# Patient Record
Sex: MALE | Race: WHITE | NOT HISPANIC OR LATINO | Employment: FULL TIME | URBAN - METROPOLITAN AREA
[De-identification: names, ages, dates, MRNs, and addresses within clinical notes are randomized per-mention and may not be internally consistent; named-entity substitution may affect disease eponyms.]

---

## 2017-01-10 ENCOUNTER — OFFICE VISIT (OUTPATIENT)
Dept: FAMILY MEDICINE | Facility: CLINIC | Age: 52
End: 2017-01-10
Payer: OTHER MISCELLANEOUS

## 2017-01-10 ENCOUNTER — ALLIED HEALTH/NURSE VISIT (OUTPATIENT)
Dept: FAMILY MEDICINE | Facility: CLINIC | Age: 52
End: 2017-01-10
Payer: OTHER MISCELLANEOUS

## 2017-01-10 ENCOUNTER — RADIANT APPOINTMENT (OUTPATIENT)
Dept: GENERAL RADIOLOGY | Facility: CLINIC | Age: 52
End: 2017-01-10
Attending: NURSE PRACTITIONER
Payer: OTHER MISCELLANEOUS

## 2017-01-10 VITALS
SYSTOLIC BLOOD PRESSURE: 138 MMHG | WEIGHT: 166 LBS | HEART RATE: 71 BPM | TEMPERATURE: 97.7 F | DIASTOLIC BLOOD PRESSURE: 80 MMHG | RESPIRATION RATE: 16 BRPM | HEIGHT: 70 IN | BODY MASS INDEX: 23.77 KG/M2

## 2017-01-10 DIAGNOSIS — M25.561 ACUTE PAIN OF RIGHT KNEE: ICD-10-CM

## 2017-01-10 DIAGNOSIS — M54.50 ACUTE RIGHT-SIDED LOW BACK PAIN WITHOUT SCIATICA: Primary | ICD-10-CM

## 2017-01-10 DIAGNOSIS — S39.92XA BACK INJURY, INITIAL ENCOUNTER: ICD-10-CM

## 2017-01-10 DIAGNOSIS — Z23 NEED FOR PROPHYLACTIC VACCINATION AND INOCULATION AGAINST INFLUENZA: Primary | ICD-10-CM

## 2017-01-10 DIAGNOSIS — R01.1 MURMUR, CARDIAC: ICD-10-CM

## 2017-01-10 PROCEDURE — 90471 IMMUNIZATION ADMIN: CPT

## 2017-01-10 PROCEDURE — 90686 IIV4 VACC NO PRSV 0.5 ML IM: CPT

## 2017-01-10 PROCEDURE — 99203 OFFICE O/P NEW LOW 30 MIN: CPT | Performed by: NURSE PRACTITIONER

## 2017-01-10 PROCEDURE — 71101 X-RAY EXAM UNILAT RIBS/CHEST: CPT | Mod: RT

## 2017-01-10 PROCEDURE — 99207 ZZC NO CHARGE NURSE ONLY: CPT

## 2017-01-10 ASSESSMENT — PAIN SCALES - GENERAL: PAINLEVEL: EXTREME PAIN (8)

## 2017-01-10 NOTE — MR AVS SNAPSHOT
After Visit Summary   1/10/2017    Joshua Braden    MRN: [id number]           Patient Information     Date Of Birth          1965        Visit Information        Provider Department      1/10/2017 8:40 AM Lisbeth Burns APRN [facility]        Today's Diagnoses     Acute right-sided low back pain without sciatica    -  1     Murmur, cardiac         Acute pain of right knee         Back injury, initial encounter           Care Instructions    Continue with rest and gentle stretching, ice or heat and ibuprofen as needed for pain.  Call to start physical therapy.  Will notify of pending x ray of ribs.  Follow up if symptoms persist or worsen and as needed.        Thank you for choosing [facility].  You may be receiving a survey in the mail from [employer] regarding your visit today.  Please take a few minutes to complete and return the survey to let us know how we are doing.      Our Clinic hours are:  Mondays    7:20 am - 7 pm  Tues -  Fri  7:20 am - 5 pm    Clinic Phone: [phone]    The clinic lab opens at 7:30 am Mon - Fri and appointments are required.    [facility]. [phone]  Monday-Thursday 8 am - 7pm  Tues/Wed/Fri 8 am - 5:30 pm               Follow-ups after your visit        Additional Services     PHYSICAL THERAPY REFERRAL       *This therapy referral will be filtered to a centralized scheduling office at [facility] and the patient will receive a call to schedule an appointment at a [facility] location most convenient for them. *     [facility] provides Physical Therapy evaluation and treatment and many specialty services across the [facility] system.  If requesting a specialty program, please choose from the list below.    If you have not heard from the scheduling office within 2 business days, please call [phone] for all locations, with the exception of [place], please call  [phone].  Treatment: Evaluation & Treatment  Special Instructions/Modalities:   Special Programs:     Please be aware that coverage of these services is subject to the terms and limitations of your health insurance plan.  Call member services at your health plan with any benefit or coverage questions.      **Note to Provider:  If you are referring outside of [place] for the therapy appointment, please list the name of the location in the  special instructions  above, print the referral and give to the patient to schedule the appointment.                  Your next 10 appointments already scheduled     Hilario 10, 2017  9:10 AM   XR RIBS & CHEST RIGHT G/E 3 VIEWS with CLXR1   [facility]    [address] [address] [phone]           Please bring a list of your current medicines to your exam. (Include vitamins, minerals and over-thecounter medicines.) Leave your valuables at home.  Tell your doctor if there is a chance you may be pregnant.  You do not need to do anything special for this exam.              Who to contact     If you have questions or need follow up information about today's clinic visit or your schedule please contact [facility]note 2] directly at [phone].  Normal or non-critical lab and imaging results will be communicated to you by MyChart, letter or phone within 4 business days after the clinic has received the results. If you do not hear from us within 7 days, please contact the clinic through [email or web address] or phone. If you have a critical or abnormal lab result, we will notify you by phone as soon as possible.  Submit refill requests through [employer] or call your pharmacy and they will forward the refill request to us. Please allow 3 business days for your refill to be completed.          Additional Information About Your Visit        [employer] Information     [employer] lets you send messages to your doctor,  "view your test results, renew your prescriptions, schedule appointments and more. To sign up, go to [email or web address] . Click on \"Log in\" on the left side of the screen, which will take you to the Welcome page. Then click on \"Sign up Now\" on the right side of the page.     You will be asked to enter the access code listed below, as well as some personal information. Please follow the directions to create your username and password.     Your access code is: [email or web address]  Expires: 4/10/2017  9:08 AM     Your access code will  in 90 days. If you need help or a new code, please call your [facility] or [phone].        Care EveryWhere ID     This is your Care EveryWhere ID. This could be used by other organizations to access your [place] medical records  [id number]        Your Vitals Were     Pulse Temperature Respirations Height BMI (Body Mass Index)       71 97.7  F (36.5  C) (Oral) 16 5' 9.5\" (1.765 m) 24.17 kg/m2        Blood Pressure from Last 3 Encounters:   01/10/17 138/80    Weight from Last 3 Encounters:   01/10/17 166 lb (75.297 kg)              We Performed the Following     PHYSICAL THERAPY REFERRAL        Primary Care Provider    None Specified       No primary provider on file.        Thank you!     Thank you for choosing [facility]  for your care. Our goal is always to provide you with excellent care. Hearing back from our patients is one way we can continue to improve our services. Please take a few minutes to complete the written survey that you may receive in the mail after your visit with us. Thank you!             Your Updated Medication List - Protect others around you: Learn how to safely use, store and throw away your medicines at www.disposemymeds.org.      Notice  As of 1/10/2017  9:08 AM    You have not been prescribed any medications.      "

## 2017-01-10 NOTE — PATIENT INSTRUCTIONS
Continue with rest and gentle stretching, ice or heat and ibuprofen as needed for pain.  Call to start physical therapy.  Will notify of pending x ray of ribs.  Follow up if symptoms persist or worsen and as needed.        Thank you for choosing [facility].  You may be receiving a survey in the mail from [employer] regarding your visit today.  Please take a few minutes to complete and return the survey to let us know how we are doing.      Our Clinic hours are:  Mondays    7:20 am - 7 pm  Tues -  Fri  7:20 am - 5 pm    Clinic Phone: [phone]    The clinic lab opens at 7:30 am Mon - Fri and appointments are required.    [facility]. [phone]  Monday-Thursday 8 am - 7pm  Tues/Wed/Fri 8 am - 5:30 pm

## 2017-01-10 NOTE — PROGRESS NOTES
SUBJECTIVE:                                                    Joshua Braden is a 51 year old male who presents to clinic today for the following health issues:      Pt is new here to us, he moved here this last summer. He is here for a work comp injury from 1/3/2017 where he slipped on the ice and injured his right low back and right knee.        Problem list and histories reviewed & adjusted, as indicated.  Additional history: new patient to this clinic, previous healthcare from [place] and he now lives in the area.  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 He was out at a job site one week ago when he slipped on ice while getting out of his truck. Initially he felt pain in left elbow, where he landed and right knee. His left elbow never bruised or swelled and is now fine. His right knee never bruised or swelled and is much better, occasionally he'll feel some discomfort if he moves his right knee in a particular manor and pain is located on outer knee. He has pain in his low back now, worse on right side without radiation into buttocks or legs. He states heat feels really good. He initially did try some ice. Ibuprofen helps as well.  He is able to do his usual and full work duties.   PMH was updated in EMR and is non contributory to today's workman's comp issues.  He will sign release of past medical records.      Patient Active Problem List   Diagnosis     Murmur, cardiac     Past Surgical History   Procedure Laterality Date     Hernia repair       bilateral inguinal 2010     Ent surgery       adenoidectomy and PE tubes as a child       Social History   Substance Use Topics     Smoking status: Never Smoker      Smokeless tobacco: Not on file     Alcohol Use: Yes     Family History   Problem Relation Age of Onset     Lung Cancer Father          No current outpatient prescriptions on file.     No Known Allergies    ROS:  C: NEGATIVE for fever, chills, change in weight or  "appetite  INTEGUMENTARY/SKIN: NEGATIVE for worrisome rashes, moles or lesions  E/M: NEGATIVE for eye, ear or throat problems  R: NEGATIVE for significant cough or SOB  CV: NEGATIVE for chest pain, palpitations   GI: NEGATIVE for nausea, abdominal pain, heartburn, or change in bowel habits  M/S: POSITIVE for right low back pain without radiation, outer right knee pain  ENDO: NEGATIVE for heat or cold intolerance  PSYCH: NEGATIVE for anxiety or depression  G/U: NEGATIVE for urinary concerns    OBJECTIVE:                                                    /80 mmHg  Pulse 71  Temp(Src) 97.7  F (36.5  C) (Oral)  Resp 16  Ht 5' 9.5\" (1.765 m)  Wt 166 lb (75.297 kg)  BMI 24.17 kg/m2  Body mass index is 24.17 kg/(m^2).  GENERAL: healthy, alert and no distress  HENT: ear canals and TM's normal, pharynx without erythema  NECK: no adenopathy, no asymmetry, FROM  RESP: lungs clear to auscultation - no rales, rhonchi or wheezes  CV: regular rate and rhythm, normal S1 S2, no S3 or S4, no murmur  ABDOMEN: soft, nontender, no hepatosplenomegaly, no masses and bowel sounds normal, no CVA tenderness  MS: no gross musculoskeletal defects noted, no swelling of right knee, FROM of right knee, left elbow, bruising in mid left thoracic area, no pain over ribs, pain in lower right back with slightly decreased forward and back flexion of spine, 2 + DTR's, negative SLR      Diagnostic Test Results:  No results found for this or any previous visit (from the past 24 hour(s)).     ASSESSMENT/PLAN:                                                            1. Acute right-sided low back pain without sciatica    - PHYSICAL THERAPY REFERRAL  Will notify of pending x ray of ribs and reviewed the care if there was a fracture. I would expect his concerns to resolve with conservative treatment and he will follow up accordingly.    2. Acute pain of right knee    - PHYSICAL THERAPY REFERRAL    3. Back injury, initial encounter    - XR Ribs & " Chest Right G/E 3 Views; Future    4. Murmur, cardiac        See Patient Instructions  Patient Instructions   Continue with rest and gentle stretching, ice or heat and ibuprofen as needed for pain.  Call to start physical therapy.  Will notify of pending x ray of ribs.  Follow up if symptoms persist or worsen and as needed.        Thank you for choosing [facility].  You may be receiving a survey in the mail from [employer] regarding your visit today.  Please take a few minutes to complete and return the survey to let us know how we are doing.      Our Clinic hours are:  Mondays    7:20 am - 7 pm  Tues -  Fri  7:20 am - 5 pm    Clinic Phone: [phone]    The clinic lab opens at 7:30 am Mon - Fri and appointments are required.    [facility]  Ph. [phone]  Monday-Thursday 8 am - 7pm  Tues/Wed/Fri 8 am - 5:30 pm               HARDIK Lobato [facility]

## 2017-01-10 NOTE — NURSING NOTE
"Chief Complaint   Patient presents with     Back Pain       Initial /80 mmHg  Pulse 71  Temp(Src) 97.7  F (36.5  C) (Oral)  Resp 16  Ht 5' 9.5\" (1.765 m)  Wt 166 lb (75.297 kg)  BMI 24.17 kg/m2 Estimated body mass index is 24.17 kg/(m^2) as calculated from the following:    Height as of this encounter: 5' 9.5\" (1.765 m).    Weight as of this encounter: 166 lb (75.297 kg).  BP completed using cuff size: regular  "

## 2017-01-10 NOTE — PROGRESS NOTES
Injectable Influenza Immunization Documentation    1.  Is the person to be vaccinated sick today?  No    2. Does the person to be vaccinated have an allergy to eggs or to a component of the vaccine?  No    3. Has the person to be vaccinated today ever had a serious reaction to influenza vaccine in the past?  No    4. Has the person to be vaccinated ever had Guillain-Vanlue syndrome?  No     Form completed by Minnie Young MA

## 2017-01-18 ENCOUNTER — HOSPITAL ENCOUNTER (OUTPATIENT)
Dept: PHYSICAL THERAPY | Facility: CLINIC | Age: 52
Setting detail: THERAPIES SERIES
End: 2017-01-18
Attending: NURSE PRACTITIONER
Payer: OTHER MISCELLANEOUS

## 2017-01-18 DIAGNOSIS — M25.561 ACUTE PAIN OF RIGHT KNEE: ICD-10-CM

## 2017-01-18 DIAGNOSIS — M54.50 ACUTE RIGHT-SIDED LOW BACK PAIN WITHOUT SCIATICA: Primary | ICD-10-CM

## 2017-01-18 PROCEDURE — 40000718 ZZHC STATISTIC PT DEPARTMENT ORTHO VISIT: Performed by: PHYSICAL THERAPIST

## 2017-01-18 PROCEDURE — 97161 PT EVAL LOW COMPLEX 20 MIN: CPT | Mod: GP | Performed by: PHYSICAL THERAPIST

## 2017-01-18 PROCEDURE — 97110 THERAPEUTIC EXERCISES: CPT | Mod: GP | Performed by: PHYSICAL THERAPIST

## 2017-03-10 ENCOUNTER — RADIANT APPOINTMENT (OUTPATIENT)
Dept: GENERAL RADIOLOGY | Facility: CLINIC | Age: 52
End: 2017-03-10
Attending: NURSE PRACTITIONER
Payer: OTHER MISCELLANEOUS

## 2017-03-10 ENCOUNTER — OFFICE VISIT (OUTPATIENT)
Dept: FAMILY MEDICINE | Facility: CLINIC | Age: 52
End: 2017-03-10
Payer: OTHER MISCELLANEOUS

## 2017-03-10 VITALS
RESPIRATION RATE: 16 BRPM | TEMPERATURE: 97.7 F | HEART RATE: 86 BPM | HEIGHT: 70 IN | WEIGHT: 173 LBS | BODY MASS INDEX: 24.77 KG/M2 | DIASTOLIC BLOOD PRESSURE: 78 MMHG | SYSTOLIC BLOOD PRESSURE: 131 MMHG

## 2017-03-10 DIAGNOSIS — M25.561 RIGHT KNEE PAIN, UNSPECIFIED CHRONICITY: Primary | ICD-10-CM

## 2017-03-10 DIAGNOSIS — M25.561 RIGHT KNEE PAIN, UNSPECIFIED CHRONICITY: ICD-10-CM

## 2017-03-10 PROCEDURE — 73560 X-RAY EXAM OF KNEE 1 OR 2: CPT | Mod: RT

## 2017-03-10 PROCEDURE — 99213 OFFICE O/P EST LOW 20 MIN: CPT | Performed by: NURSE PRACTITIONER

## 2017-03-10 ASSESSMENT — PAIN SCALES - GENERAL: PAINLEVEL: SEVERE PAIN (7)

## 2017-03-10 NOTE — PATIENT INSTRUCTIONS
Will let you know about results of x ray.  Start physical therapy.  If not improvement or worsening, will consider MRI.  Follow up if symptoms persist or worsen and as needed.        Thank you for choosing [facility].  You may be receiving a survey in the mail from [employer] regarding your visit today.  Please take a few minutes to complete and return the survey to let us know how we are doing.      Our Clinic hours are:  Mondays    7:20 am - 7 pm  Tues -  Fri  7:20 am - 5 pm    Clinic Phone: [phone]    The clinic lab opens at 7:30 am Mon - Fri and appointments are required.    [facility]  Ph. [phone]  Monday-Thursday 8 am - 7pm  Tues/Wed/Fri 8 am - 5:30 pm

## 2017-03-10 NOTE — MR AVS SNAPSHOT
After Visit Summary   3/10/2017    Joshua Braden    MRN: [id number]           Patient Information     Date Of Birth          1965        Visit Information        Provider Department      3/10/2017 2:40 PM Lisebth Burns APRN [facility]        Today's Diagnoses     Right knee pain, unspecified chronicity    -  1      Care Instructions    Will let you know about results of x ray.  Start physical therapy.  If not improvement or worsening, will consider MRI.  Follow up if symptoms persist or worsen and as needed.        Thank you for choosing [facility].  You may be receiving a survey in the mail from [facility] [employer] regarding your visit today.  Please take a few minutes to complete and return the survey to let us know how we are doing.      Our Clinic hours are:  Mondays    7:20 am - 7 pm  Tues -  Fri  7:20 am - 5 pm    Clinic Phone: [phone]    The clinic lab opens at 7:30 am Mon - Fri and appointments are required.    [facility]  Ph. [phone]  Monday-Thursday 8 am - 7pm  Tues/Wed/Fri 8 am - 5:30 pm               Follow-ups after your visit        Additional Services     PHYSICAL THERAPY REFERRAL       *This therapy referral will be filtered to a centralized scheduling office at [facility] and the patient will receive a call to schedule an appointment at a [facility] [place] location most convenient for them. *     [facility] provides Physical Therapy evaluation and treatment and many specialty services across the [facility] system.  If requesting a specialty program, please choose from the list below.    If you have not heard from the scheduling office within 2 business days, please call [phone] for all locations, with the exception of [place], please call [phone].  Treatment: Evaluation & Treatment  Special Instructions/Modalities:   Special Programs:     Please be aware that coverage of these  "services is subject to the terms and limitations of your health insurance plan.  Call member services at your health plan with any benefit or coverage questions.      **Note to Provider:  If you are referring outside of [place] for the therapy appointment, please list the name of the location in the  special instructions  above, print the referral and give to the patient to schedule the appointment.                  Your next 10 appointments already scheduled     Mar 10, 2017  2:50 PM CST   XR KNEE RIGHT 1/2 VIEWS with CLXR1   [facility]    [address] [address]   [phone]           Please bring a list of your current medicines to your exam. (Include vitamins, minerals and over-thecounter medicines.) Leave your valuables at home.  Tell your doctor if there is a chance you may be pregnant.  You do not need to do anything special for this exam.              Who to contact     If you have questions or need follow up information about today's clinic visit or your schedule please contact [facility] directly at [phone].  Normal or non-critical lab and imaging results will be communicated to you by My eShoehart, letter or phone within 4 business days after the clinic has received the results. If you do not hear from us within 7 days, please contact the clinic through [employer] or phone. If you have a critical or abnormal lab result, we will notify you by phone as soon as possible.  Submit refill requests through [employer] or call your pharmacy and they will forward the refill request to us. Please allow 3 business days for your refill to be completed.          Additional Information About Your Visit        My eShoeharDouble Doods Information     [employer] lets you send messages to your doctor, view your test results, renew your prescriptions, schedule appointments and more. To sign up, go to [email or web address] . Click on \"Log in\" on the " "left side of the screen, which will take you to the Welcome page. Then click on \"Sign up Now\" on the right side of the page.     You will be asked to enter the access code listed below, as well as some personal information. Please follow the directions to create your username and password.     Your access code is: [email or web address]  Expires: 4/10/2017  9:08 AM     Your access code will  in 90 days. If you need help or a new code, please call your [facility] or [phone].        Care EveryWhere ID     This is your Care EveryWhere ID. This could be used by other organizations to access your [place] medical records  [id number]        Your Vitals Were     Pulse Temperature Respirations Height BMI (Body Mass Index)       86 97.7  F (36.5  C) (Oral) 16 5' 9.5\" (1.765 m) 25.18 kg/m2        Blood Pressure from Last 3 Encounters:   03/10/17 131/78   01/10/17 138/80    Weight from Last 3 Encounters:   03/10/17 173 lb (78.5 kg)   01/10/17 166 lb (75.3 kg)              We Performed the Following     PHYSICAL THERAPY REFERRAL        Primary Care Provider    None Specified       No primary provider on file.        Thank you!     Thank you for choosing [facility]  for your care. Our goal is always to provide you with excellent care. Hearing back from our patients is one way we can continue to improve our services. Please take a few minutes to complete the written survey that you may receive in the mail after your visit with us. Thank you!             Your Updated Medication List - Protect others around you: Learn how to safely use, store and throw away your medicines at www.disposemymeds.org.      Notice  As of 3/10/2017  2:47 PM    You have not been prescribed any medications.      "

## 2017-03-10 NOTE — NURSING NOTE
"Chief Complaint   Patient presents with     Work Comp     right knee       Initial /78 (BP Location: Right arm, Patient Position: Chair, Cuff Size: Adult Large)  Pulse 86  Temp 97.7  F (36.5  C) (Oral)  Resp 16  Ht 5' 9.5\" (1.765 m)  Wt 173 lb (78.5 kg)  BMI 25.18 kg/m2 Estimated body mass index is 25.18 kg/(m^2) as calculated from the following:    Height as of this encounter: 5' 9.5\" (1.765 m).    Weight as of this encounter: 173 lb (78.5 kg).  Medication Reconciliation: complete  "

## 2017-03-10 NOTE — PROGRESS NOTES
"  SUBJECTIVE:                                                    Joshua Braden is a 51 year old male who presents to clinic today for the following health issues:      Pt is here for a recheck on the work comp injury. His back is better but not his right knee.          Problem list and histories reviewed & adjusted, as indicated.  Additional history: states his back and ribs got much better with PT.  His outer right knee has bothered him on and off since injury this winter when he slipped on ice while getting out of truck.  Pain is located on outer right knee. He denies any swelling. Pain is worse with climbing up and down ladders. He denies any known previous problems with his knee.  He's been taking ibuprofen daily for the discomfort.  Has been able to continue with usual work duties.      Patient Active Problem List   Diagnosis     Murmur, cardiac     Past Surgical History   Procedure Laterality Date     Hernia repair       bilateral inguinal 2010     Ent surgery       adenoidectomy and PE tubes as a child       Social History   Substance Use Topics     Smoking status: Never Smoker     Smokeless tobacco: Not on file     Alcohol use Yes     Family History   Problem Relation Age of Onset     Lung Cancer Father          No current outpatient prescriptions on file.     No Known Allergies    Reviewed and updated as needed this visit by clinical staff  Tobacco  Allergies  Med Hx  Surg Hx  Fam Hx  Soc Hx      Reviewed and updated as needed this visit by Provider          ROS: 10 point ROS neg other than the symptoms noted above in the HPI.    OBJECTIVE:                                                    /78 (BP Location: Right arm, Patient Position: Chair, Cuff Size: Adult Large)  Pulse 86  Temp 97.7  F (36.5  C) (Oral)  Resp 16  Ht 5' 9.5\" (1.765 m)  Wt 173 lb (78.5 kg)  BMI 25.18 kg/m2  Body mass index is 25.18 kg/(m^2).  GENERAL: healthy, alert and no distress  NECK: no adenopathy, no asymmetry  RESP: " lungs clear to auscultation - no rales, rhonchi or wheezes  CV: regular rate and rhythm, normal S1 S2, no S3 or S4, no murmur  MS: no gross musculoskeletal defects noted, FROM of right knee, no crepitus, swelling or erythema, pain is localized to lateral joint line, no instability of joint      Diagnostic Test Results:  none      ASSESSMENT/PLAN:                                                            1. Right knee pain, unspecified chronicity    - XR Knee Right 1/2 Views; Future  - PHYSICAL THERAPY REFERRAL    See Patient Instructions  Patient Instructions   Will let you know about results of x ray.  Start physical therapy.  If not improvement or worsening, will consider MRI.  Follow up if symptoms persist or worsen and as needed.        Thank you for choosing [facility].  You may be receiving a survey in the mail from [employer] regarding your visit today.  Please take a few minutes to complete and return the survey to let us know how we are doing.      Our Clinic hours are:  Mondays    7:20 am - 7 pm  Tues -  Fri  7:20 am - 5 pm    Clinic Phone: [phone]    The clinic lab opens at 7:30 am Mon - Fri and appointments are required.    [facility]  Ph. [phone]  Monday-Thursday 8 am - 7pm  Tues/Wed/Fri 8 am - 5:30 pm             HARDIK Lobato CNP  [facility]

## 2017-03-21 ENCOUNTER — HOSPITAL ENCOUNTER (OUTPATIENT)
Dept: PHYSICAL THERAPY | Facility: CLINIC | Age: 52
Setting detail: THERAPIES SERIES
End: 2017-03-21
Attending: NURSE PRACTITIONER
Payer: OTHER MISCELLANEOUS

## 2017-03-21 PROCEDURE — 40000718 ZZHC STATISTIC PT DEPARTMENT ORTHO VISIT: Performed by: PHYSICAL THERAPIST

## 2017-03-21 PROCEDURE — 97161 PT EVAL LOW COMPLEX 20 MIN: CPT | Mod: GP | Performed by: PHYSICAL THERAPIST

## 2017-03-21 NOTE — PROGRESS NOTES
Joshua Braden  1965 Physical Therapy Initial Evaluation  03/21/17 0900   General Information   Type of Visit Initial OP Ortho PT Evaluation   Start of Care Date 03/21/17   Referring Physician Lisbeth Burns   Patient/Family Goals Statement Get out of car without knee pain   Orders Evaluate and Treat   Date of Order 03/10/17   Insurance Type Other  (WC)   Insurance Comments/Visits Authorized 1   Medical Diagnosis Right knee pain   Surgical/Medical history reviewed Yes   Precautions/Limitations no known precautions/limitations   Body Part(s)   Body Part(s) Knee   Presentation and Etiology   Pertinent history of current problem (include personal factors and/or comorbidities that impact the POC) Stepped out of car and stepped on ice and hurt knee, back, and ribs. Back is better, but still having knee pain. Climbing ladders, squatting, climbing stairs all hurt. Will hurt knee when walking occasionally. Will get pain on outside of knee. 1/10 pain currently. Will get pain when active. / Comorbidities - heart murmur    Impairments A. Pain;B. Decreased WB tolerance;E. Decreased flexibility   Functional Limitations perform activities of daily living;perform required work activities;perform desired leisure / sports activities   Symptom Location Right lateral knee along joint line   How/Where did it occur With a fall;At work   Onset date of current episode/exacerbation 01/03/17   Chronicity Chronic  (Original injury over 2 months ago.)   Pain rating (0-10 point scale) Best (/10);Worst (/10)   Best (/10) 2   Worst (/10) 9   Pain quality A. Sharp;E. Shooting   Frequency of pain/symptoms D. Other   Pain frequency comment 75%   Pain/symptoms exacerbated by G. Certain positions;M. Other   Pain exacerbation comment Bending up-down   Pain/symptoms eased by C. Rest   Prior Level of Function   Prior Level of Function-Mobility Ind   Prior Level of Function-ADLs Ind   Current Level of Function   Patient role/employment history A.  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   Fall Risk Screen   Fall screen completed by PT   Per patient - Fall 2 or more times in past year? No   Per patient - Fall with injury in past year? Yes   Timed Up and Go score (seconds) 7   Is patient a fall risk? No   Functional Scales   Functional Scales OPTIMAL   Optimal (1=able to do without difficulty, 2=able to do with little difficulty, 3=able to do with moderate difficulty, 4=able to do with much difficulty, 5=unable to do, 9=NA) Activity 1;Activity 2;Activity 3   Activity 1 comment Squatting - 4/5   Activity 2 comment Bending-stooping - 4/5   Activity 3 comment Climbing stairs - 4/5   Knee Objective Findings   Side (if bilateral, select both right and left) Right   Gait/Locomotion Good heel strike and lumbar rotation / Toe out equal B   Anterior Drawer Test Negative   Posterior Drawer Test Negative   Varus Stress Test Negative   Valgus Stress Test Negative   Apley's Test Negative   Apprehension Test Pain on lateral knee   Palpation Tender to palpation over lateral joint line   Right Knee Extension AROM 0 B   Right Knee Flexion AROM 127 / Left - 134   Right Knee Flexion Strength 5/5 with pain on lateral knee   Right Knee Extension Strength 5/5 B   Right Hip Abduction Strength 4/5   Right Gastrocnemius Flexibility Mildly restricted   Right Hamstring Flexibility Moderately restricted   Right Quadricep Flexibility Moderately restricted   Planned Therapy Interventions   Planned Therapy Interventions balance training;joint mobilization;manual therapy;neuromuscular re-education;ROM;strengthening;stretching   Planned Modality Interventions   Planned Modality Interventions Cryotherapy;Hot packs;TENS;Ultrasound;Iontophoresis   Clinical Impression   Criteria for Skilled Therapeutic Interventions Met yes, treatment indicated   PT Diagnosis Right knee pain causing restrictions in knee ROM, flexibility and strength   Influenced by the  following impairments Pain, Flexibility deficits, Strength deficits   Functional limitations due to impairments Difficulty moving from kneeling to standing, difficulty with work activities, stairs.   Clinical Presentation Stable/Uncomplicated   Clinical Presentation Rationale 1 comorbidity / Knee pain is not progressing / 1 body part   Clinical Decision Making (Complexity) Low complexity   Therapy Frequency 1 time/week   Predicted Duration of Therapy Intervention (days/wks) 8 weeks   Risk & Benefits of therapy have been explained Yes   Patient, Family & other staff in agreement with plan of care Yes   Education Assessment   Preferred Learning Style Listening;Reading;Demonstration;Pictures/video   Barriers to Learning No barriers   ORTHO GOALS   PT Ortho Eval Goals 1;2;3;4   Ortho Goal 1   Goal Identifier HEP   Goal Description Pt will be independent in HEP in order to achieve and maintain long term treatment goals.   Target Date 05/16/17   Ortho Goal 2   Goal Identifier Driving   Goal Description Pt will be able to drive for work and get out of truck without an increase in pain.   Target Date 05/16/17   Ortho Goal 3   Goal Identifier Stairs   Goal Description Pt will be able to ascend and descend 1 flight of stairs without an increase in pain.   Target Date 05/16/17   Ortho Goal 4   Goal Identifier Kneeling   Goal Description Pt will be able to stand from a kneeling position without an increase in pain, in order to complete work duties.   Target Date 05/16/17   Total Evaluation Time   Total Evaluation Time 20     Ino St, PT, DPT

## 2017-04-24 ENCOUNTER — HOSPITAL ENCOUNTER (OUTPATIENT)
Dept: PHYSICAL THERAPY | Facility: CLINIC | Age: 52
Setting detail: THERAPIES SERIES
End: 2017-04-24
Attending: NURSE PRACTITIONER
Payer: OTHER MISCELLANEOUS

## 2017-04-24 PROCEDURE — 97110 THERAPEUTIC EXERCISES: CPT | Mod: GP | Performed by: PHYSICAL THERAPIST

## 2017-04-24 PROCEDURE — 97140 MANUAL THERAPY 1/> REGIONS: CPT | Mod: GP | Performed by: PHYSICAL THERAPIST

## 2017-04-24 PROCEDURE — 40000718 ZZHC STATISTIC PT DEPARTMENT ORTHO VISIT: Performed by: PHYSICAL THERAPIST

## 2017-04-24 NOTE — PROGRESS NOTES
Joshua Braden  1965  Diagnosis - Right knee pain Physical Therapy Progress Note  04/24/17 1600   Signing Clinician's Name / Credentials   Signing clinician's name / credentials Ino St PT, DPT   Session Number   Session Number 2 (Start of Care Date - 3/21/2017)   Progress Note/Recertification   Progress Note Completed Date 04/24/17   Adult Goals   PT Ortho Eval Goals 1;2;3;4   Ortho Goal 1   Goal Identifier HEP   Goal Description Pt will be independent in HEP in order to achieve and maintain long term treatment goals.   Target Date 05/16/17   Date Met 04/24/17   Ortho Goal 2   Goal Identifier Driving   Goal Description Pt will be able to drive for work and get out of truck without an increase in pain.  (Not met)   Target Date 05/16/17   Ortho Goal 3   Goal Identifier Stairs   Goal Description Pt will be able to ascend and descend 1 flight of stairs without an increase in pain.  (Not met)   Target Date 05/16/17   Ortho Goal 4   Goal Identifier Kneeling   Goal Description Pt will be able to stand from a kneeling position without an increase in pain, in order to complete work duties.  (Not met)   Target Date 05/16/17   Subjective Report   Subjective Report Knee is about the same as last visit. Still has difficulty with lifting leg up to get into shower and kneeling on it. HEP going fine. Doing 1-2 times / day.    Objective Measures   Objective Measures Objective Measure 1;Objective Measure 2;Objective Measure 3   Objective Measure 1   Objective Measure ROM   Details Flexion - 125 (with discomfort on lateral knee joint line) / Extension - 0   Objective Measure 2   Objective Measure Palpation   Details Tenderness with palpation of lateral joint line   Objective Measure 3   Objective Measure Special tests   Details Positive apprehension test / Negative Anterior Drawer, Posterior drawer, Varus test, Valgus test   Treatment Interventions   Interventions Therapeutic Procedure/Exercise;Manual Therapy    Therapeutic Procedure/exercise   Minutes 10   Skilled Intervention Exercise instruction   Patient Response Cueing required for foot placement for mini squats   Treatment Detail Quadriceps stretch x30 seconds / HS stretch x30 seconds / Standing hip abduction x15 without band and x15 with YTB / Mini Squats x15   Manual Therapy   Minutes 15   Skilled Intervention Joint mobilizations / STM   Patient Response Mild increase in discomfort with palpation of lateral joint line   Treatment Detail STM to gastrocnmius laterally to reduce tone and pain / AP fibular head joint mobilizations grades 1-3 to improve motion and reduce pain / AP knee joint mobilizations grades 1-3 to improve pain free motion   Plan   Home program Quadriceps stretch 2x30 seconds / HS stretch 2x30 seconds / Standing hip abduction x10 / Mini Squats x5   Updates to plan of care 1 time / week for 6 weeks   Plan for next session Progress LE and core stretching and strengthening exercises as tolerated.   Comments   Comments Pt has not progressed towards goals at this time. Pt still has difficulty with kneeling, high steps, getting into vehicles and has not met 3 of 4 goals currently. Pt would benefit from continued skilled PT in order to address remaining deficits and meet remaining goals.   Total Session Time   Total Session Time 25 (1TE 1MT)     Referring Physician - Lisbeth Burns

## 2017-05-08 ENCOUNTER — TELEPHONE (OUTPATIENT)
Dept: FAMILY MEDICINE | Facility: CLINIC | Age: 52
End: 2017-05-08

## 2017-05-08 DIAGNOSIS — M25.561 RIGHT KNEE PAIN, UNSPECIFIED CHRONICITY: Primary | ICD-10-CM

## 2017-05-17 ENCOUNTER — HOSPITAL ENCOUNTER (OUTPATIENT)
Dept: MRI IMAGING | Facility: CLINIC | Age: 52
Discharge: HOME | End: 2017-05-17
Attending: NURSE PRACTITIONER | Admitting: NURSE PRACTITIONER
Payer: OTHER MISCELLANEOUS

## 2017-05-17 DIAGNOSIS — M25.561 RIGHT KNEE PAIN, UNSPECIFIED CHRONICITY: ICD-10-CM

## 2017-05-17 PROCEDURE — 73721 MRI JNT OF LWR EXTRE W/O DYE: CPT | Mod: RT

## 2017-05-19 DIAGNOSIS — M17.31 POST-TRAUMATIC OSTEOARTHRITIS OF RIGHT KNEE: Primary | ICD-10-CM

## 2017-06-14 ENCOUNTER — HOSPITAL ENCOUNTER (OUTPATIENT)
Dept: PHYSICAL THERAPY | Facility: CLINIC | Age: 52
Setting detail: THERAPIES SERIES
End: 2017-06-14
Attending: NURSE PRACTITIONER
Payer: OTHER MISCELLANEOUS

## 2017-06-14 PROCEDURE — 97110 THERAPEUTIC EXERCISES: CPT | Mod: GP | Performed by: PHYSICAL THERAPIST

## 2017-06-14 PROCEDURE — 97140 MANUAL THERAPY 1/> REGIONS: CPT | Mod: GP | Performed by: PHYSICAL THERAPIST

## 2017-06-14 PROCEDURE — 40000718 ZZHC STATISTIC PT DEPARTMENT ORTHO VISIT: Performed by: PHYSICAL THERAPIST

## 2017-06-14 NOTE — PROGRESS NOTES
Joshua Braden  1965  Diagnosis - Right knee pain Physical Therapy Progress Note  06/14/17 1500   Signing Clinician's Name / Credentials   Signing clinician's name / credentials Ino St PT, DPT   Session Number   Session Number 3 (Start of Care Date - 3/21/2017)   Progress Note/Recertification   Progress Note Completed Date 05/24/17   Adult Goals   PT Ortho Eval Goals 1;2;3;4   Ortho Goal 1   Goal Identifier HEP   Goal Description Pt will be independent in HEP in order to achieve and maintain long term treatment goals.   Target Date 05/16/17   Date Met 04/24/17   Ortho Goal 2   Goal Identifier Driving   Goal Description Pt will be able to drive for work and get out of truck without an increase in pain.   Target Date 05/16/17   Date Met 06/14/17   Ortho Goal 3   Goal Identifier Stairs   Goal Description Pt will be able to ascend and descend 1 flight of stairs without an increase in pain.  (Not met)   Target Date 05/16/17   Ortho Goal 4   Goal Identifier Kneeling   Goal Description Pt will be able to stand from a kneeling position without an increase in pain, in order to complete work duties.  (Not met)   Target Date 05/16/17   Subjective Report   Subjective Report Knee is about the same as last visit. HEP going well, doing everyday. Has not had any trouble walking, just has issues and pain with initial push off from kneeling and doing stairs.   Objective Measures   Objective Measures Objective Measure 1;Objective Measure 2;Objective Measure 3   Objective Measure 1   Objective Measure ROM   Details Flexion - 125 (with discomfort on lateral knee joint line) Extension - 0   Objective Measure 2   Objective Measure Palpation   Details Tenderness with palpation of lateral joint line and gluteus medius   Objective Measure 3   Objective Measure Special tests   Details Positive apprehension test / Negative Anterior Drawer, Posterior drawer, Valgus test. Pain with varus test but no laxity noted   Treatment  Interventions   Interventions Therapeutic Procedure/Exercise;Manual Therapy   Therapeutic Procedure/exercise   Minutes 14   Skilled Intervention Exercise instruction for strengthening   Patient Response Performed exercises well with no increase in symptoms noted   Treatment Detail Mini Lunges x15 B / Monster Walks x8 lengths / Side Steps x8 lengths    Manual Therapy   Minutes 10   Skilled Intervention STM   Patient Response Reduction in tone   Treatment Detail STM to glutues medius to reduce tone and pain   Plan   Home program Quadriceps stretch 2x30 seconds / HS stretch 2x30 seconds / Standing hip abduction x10 / Mini Squats x5   Plan for next session Progress LE and core stretching and strengthening exercises as tolerated.   Comments   Comments Pt has made some progress towards physical therapy goals and has met 2 of 4 goals. Pt has continued to perform HEP, even though PT attendance has been sporadic. Pt would benefit from skilled PT in order to build strength, improve flexibility and reduce pain and improve functional ability.   Total Session Time   Total Session Time 24 (1TE 1MT)     Referring Physician - Lisbeth Burns

## 2017-06-14 NOTE — PROGRESS NOTES
Joshua Braden  1965  Diagnosis - Right Knee Pain Physical Therapy Progress Note  06/14/17 1500   Signing Clinician's Name / Credentials   Signing clinician's name / credentials Ino St PT, DPT   Session Number   Session Number 3 (Start of Care Date - 3/21/2017)   Progress Note/Recertification   Progress Note Completed Date 05/24/17   Adult Goals   PT Ortho Eval Goals 1;2;3;4   Ortho Goal 1   Goal Identifier HEP   Goal Description Pt will be independent in HEP in order to achieve and maintain long term treatment goals.   Target Date 05/16/17   Date Met 04/24/17   Ortho Goal 2   Goal Identifier Driving   Goal Description Pt will be able to drive for work and get out of truck without an increase in pain.   Target Date 05/16/17   Date Met 06/14/17   Ortho Goal 3   Goal Identifier Stairs   Goal Description Pt will be able to ascend and descend 1 flight of stairs without an increase in pain.  (Not met)   Target Date 05/16/17   Ortho Goal 4   Goal Identifier Kneeling   Goal Description Pt will be able to stand from a kneeling position without an increase in pain, in order to complete work duties.  (Not met)   Target Date 05/16/17   Subjective Report   Subjective Report Knee is about the same as last visit. HEP going well, doing everyday. Has not had any trouble walking, just has issues and pain with initial push off from kneeling and doing stairs.   Objective Measures   Objective Measures Objective Measure 1;Objective Measure 2;Objective Measure 3   Objective Measure 1   Objective Measure ROM   Details Flexion - 125 (with discomfort on lateral knee joint line) Extension - 0   Objective Measure 2   Objective Measure Palpation   Details Tenderness with palpation of lateral joint line and gluteus medius   Objective Measure 3   Objective Measure Special tests   Details Positive apprehension test / Negative Anterior Drawer, Posterior drawer, Valgus test. Pain with varus test but no laxity noted   Treatment  Interventions   Interventions Therapeutic Procedure/Exercise;Manual Therapy   Therapeutic Procedure/exercise   Minutes 14   Skilled Intervention Exercise instruction for strengthening   Patient Response Performed exercises well with no increase in symptoms noted   Treatment Detail Mini Lunges x15 B / Monster Walks x8 lengths / Side Steps x8 lengths    Manual Therapy   Minutes 10   Skilled Intervention STM   Patient Response Reduction in tone   Treatment Detail STM to glutues medius to reduce tone and pain   Plan   Home program Quadriceps stretch 2x30 seconds / HS stretch 2x30 seconds / Standing hip abduction x10 / Mini Squats x5   Updates to plan of care 1 time / week for 6 weeks   Plan for next session Progress LE and core stretching and strengthening exercises as tolerated.   Comments   Comments Pt has made some progress towards physical therapy goals and has met 2 of 4 goals. Pt has continued to perform HEP, even though PT attendance has been sporadic. Pt would benefit from skilled PT in order to build strength, improve flexibility and reduce pain and improve functional ability.   Total Session Time   Total Session Time 24 (1TE 1MT)     Referring Physician - Lisbeth Burns

## 2017-06-21 ENCOUNTER — HOSPITAL ENCOUNTER (OUTPATIENT)
Dept: PHYSICAL THERAPY | Facility: CLINIC | Age: 52
Setting detail: THERAPIES SERIES
End: 2017-06-21
Attending: NURSE PRACTITIONER
Payer: OTHER MISCELLANEOUS

## 2017-06-21 PROCEDURE — 97110 THERAPEUTIC EXERCISES: CPT | Mod: GP | Performed by: PHYSICAL THERAPIST

## 2017-06-21 PROCEDURE — 40000718 ZZHC STATISTIC PT DEPARTMENT ORTHO VISIT: Performed by: PHYSICAL THERAPIST

## 2017-06-21 PROCEDURE — 97140 MANUAL THERAPY 1/> REGIONS: CPT | Mod: GP | Performed by: PHYSICAL THERAPIST

## 2017-06-28 ENCOUNTER — HOSPITAL ENCOUNTER (OUTPATIENT)
Dept: PHYSICAL THERAPY | Facility: CLINIC | Age: 52
Setting detail: THERAPIES SERIES
End: 2017-06-28
Attending: NURSE PRACTITIONER
Payer: OTHER MISCELLANEOUS

## 2017-06-28 PROCEDURE — 97110 THERAPEUTIC EXERCISES: CPT | Mod: GP | Performed by: PHYSICAL THERAPIST

## 2017-06-28 PROCEDURE — 40000718 ZZHC STATISTIC PT DEPARTMENT ORTHO VISIT: Performed by: PHYSICAL THERAPIST

## 2017-06-28 PROCEDURE — 97140 MANUAL THERAPY 1/> REGIONS: CPT | Mod: GP | Performed by: PHYSICAL THERAPIST

## 2017-07-05 ENCOUNTER — HOSPITAL ENCOUNTER (OUTPATIENT)
Dept: PHYSICAL THERAPY | Facility: CLINIC | Age: 52
Setting detail: THERAPIES SERIES
End: 2017-07-05
Attending: NURSE PRACTITIONER
Payer: OTHER MISCELLANEOUS

## 2017-07-05 PROCEDURE — 40000718 ZZHC STATISTIC PT DEPARTMENT ORTHO VISIT: Performed by: PHYSICAL THERAPIST

## 2017-07-05 PROCEDURE — 97110 THERAPEUTIC EXERCISES: CPT | Mod: GP | Performed by: PHYSICAL THERAPIST

## 2017-07-05 PROCEDURE — 97140 MANUAL THERAPY 1/> REGIONS: CPT | Mod: GP | Performed by: PHYSICAL THERAPIST

## 2017-07-12 ENCOUNTER — HOSPITAL ENCOUNTER (OUTPATIENT)
Dept: PHYSICAL THERAPY | Facility: CLINIC | Age: 52
Setting detail: THERAPIES SERIES
End: 2017-07-12
Attending: NURSE PRACTITIONER
Payer: OTHER MISCELLANEOUS

## 2017-07-12 PROCEDURE — 40000718 ZZHC STATISTIC PT DEPARTMENT ORTHO VISIT: Performed by: PHYSICAL THERAPIST

## 2017-07-12 PROCEDURE — 97140 MANUAL THERAPY 1/> REGIONS: CPT | Mod: GP | Performed by: PHYSICAL THERAPIST

## 2017-07-12 PROCEDURE — 97112 NEUROMUSCULAR REEDUCATION: CPT | Mod: GP | Performed by: PHYSICAL THERAPIST

## 2017-07-12 NOTE — PROGRESS NOTES
"  Joshua Braden  1965  Diagnosis - Right Knee Pain Physical Therapy Discharge Note  07/12/17 1600   Signing Clinician's Name / Credentials   Signing clinician's name / credentials Ino St PT, DPT   Session Number   Session Number 7 (Start of Care Date - 3/21/2017)   Progress Note/Recertification   Progress Note Due Date 07/26/17   Adult Goals   PT Ortho Eval Goals 1;2;3;4   Ortho Goal 1   Goal Identifier HEP   Goal Description Pt will be independent in HEP in order to achieve and maintain long term treatment goals.   Target Date 05/16/17   Date Met 04/24/17   Ortho Goal 2   Goal Identifier Driving   Goal Description Pt will be able to drive for work and get out of truck without an increase in pain.   Target Date 05/16/17   Date Met 06/14/17   Ortho Goal 3   Goal Identifier Stairs   Goal Description Pt will be able to ascend and descend 1 flight of stairs without an increase in pain.   Target Date 05/16/17   Date Met 07/12/17   Ortho Goal 4   Goal Identifier Kneeling   Goal Description Pt will be able to stand from a kneeling position without an increase in pain, in order to complete work duties.   Target Date 05/16/17   Date Met 07/12/17   Subjective Report   Subjective Report Not having any pain today. \"I've made great strides in the last month.\"    Objective Measures   Objective Measures Objective Measure 1;Objective Measure 2;Objective Measure 3   Objective Measure 2   Objective Measure Palpation   Details No tenderness to palpation over gluteus medius   Objective Measure 3   Objective Measure Strength   Details Knee extension - 5/5 / Knee flexion - 5/5 / Hip abduction 5/5    Treatment Interventions   Interventions Therapeutic Procedure/Exercise;Manual Therapy;Neuromuscular Re-education   Neuromuscular Re-education   Minutes 15   Skilled Intervention Core re-education with balance   Patient Response No increase in discomfort noted.    Treatment Detail BOSU circles 2x15 in each direction / Bridges on " stability 2x20 / Hip extension x15 B on Stability ball / Hip extension and abduction on Stability Ball x15 B / Hip extension on Stability Ball without UE support x15 B    Manual Therapy   Minutes 9   Skilled Intervention STM / MET   Patient Response No increase in discomfort noted / Improved motion    Treatment Detail MET for hip ER on right 3x6 /    Plan   Home program Hip extension and abduction on BOSU / Bridges with RTB / Lunges / Side Steps / Quadriceps stretch 2x30 seconds / HS stretch 2x30 seconds / Mini Squats x5   Plan for next session Progress LE and core stretching and strengthening exercises as tolerated.   Comments   Comments Pt has done well throughout PT and is no longer having pain with everyday tasks and work tasks. Pt's strength has imprved and he has met all PT goals. Pt will be discharged to [facility] and is in agreement with this plan.   Total Session Time   Total Session Time 24 (1Neuro 1MT)     Referring Physician - Lisbeth Burns

## 2017-08-23 ENCOUNTER — TELEPHONE (OUTPATIENT)
Dept: FAMILY MEDICINE | Facility: CLINIC | Age: 52
End: 2017-08-23

## 2017-08-23 DIAGNOSIS — Z13.9 SCREENING FOR CONDITION: Primary | ICD-10-CM

## 2017-08-23 NOTE — TELEPHONE ENCOUNTER
Panel Management Review      Patient has the following on his problem list: None      Composite cancer screening  Chart review shows that this patient is due/due soon for the following Colonoscopy  Summary:    Patient is due/failing the following:   COLONOSCOPY    Action needed:   colonoscopy    Type of outreach:    Phone, left message for patient to call back.     Questions for provider review:    None                                                                                                                                    Minnie Young MA

## 2017-08-25 NOTE — TELEPHONE ENCOUNTER
Colonoscopy order placed, pt given # to schedule, prep instructions mailed to home address.[email or web address]

## 2017-10-06 ENCOUNTER — OFFICE VISIT (OUTPATIENT)
Dept: FAMILY MEDICINE | Facility: CLINIC | Age: 52
End: 2017-10-06
Payer: COMMERCIAL

## 2017-10-06 VITALS
HEIGHT: 70 IN | SYSTOLIC BLOOD PRESSURE: 136 MMHG | DIASTOLIC BLOOD PRESSURE: 77 MMHG | HEART RATE: 67 BPM | TEMPERATURE: 97 F | RESPIRATION RATE: 16 BRPM | WEIGHT: 173 LBS | BODY MASS INDEX: 24.77 KG/M2

## 2017-10-06 DIAGNOSIS — Z13.220 SCREENING FOR LIPOID DISORDERS: ICD-10-CM

## 2017-10-06 DIAGNOSIS — Z13.1 SCREENING FOR DIABETES MELLITUS: ICD-10-CM

## 2017-10-06 DIAGNOSIS — Z23 NEED FOR PROPHYLACTIC VACCINATION AND INOCULATION AGAINST INFLUENZA: ICD-10-CM

## 2017-10-06 DIAGNOSIS — R42 DIZZINESS: Primary | ICD-10-CM

## 2017-10-06 LAB
BASOPHILS # BLD AUTO: 0 10E9/L (ref 0–0.2)
BASOPHILS NFR BLD AUTO: 0.4 %
DIFFERENTIAL METHOD BLD: NORMAL
EOSINOPHIL # BLD AUTO: 0.1 10E9/L (ref 0–0.7)
EOSINOPHIL NFR BLD AUTO: 0.8 %
ERYTHROCYTE [DISTWIDTH] IN BLOOD BY AUTOMATED COUNT: 13.1 % (ref 10–15)
HCT VFR BLD AUTO: 47.5 % (ref 40–53)
HGB BLD-MCNC: 15.9 G/DL (ref 13.3–17.7)
LYMPHOCYTES # BLD AUTO: 1.4 10E9/L (ref 0.8–5.3)
LYMPHOCYTES NFR BLD AUTO: 17.6 %
MCH RBC QN AUTO: 29.6 PG (ref 26.5–33)
MCHC RBC AUTO-ENTMCNC: 33.5 G/DL (ref 31.5–36.5)
MCV RBC AUTO: 89 FL (ref 78–100)
MONOCYTES # BLD AUTO: 0.6 10E9/L (ref 0–1.3)
MONOCYTES NFR BLD AUTO: 8.1 %
NEUTROPHILS # BLD AUTO: 5.8 10E9/L (ref 1.6–8.3)
NEUTROPHILS NFR BLD AUTO: 73.1 %
PLATELET # BLD AUTO: 221 10E9/L (ref 150–450)
RBC # BLD AUTO: 5.37 10E12/L (ref 4.4–5.9)
WBC # BLD AUTO: 7.9 10E9/L (ref 4–11)

## 2017-10-06 PROCEDURE — 99213 OFFICE O/P EST LOW 20 MIN: CPT | Mod: 25 | Performed by: NURSE PRACTITIONER

## 2017-10-06 PROCEDURE — 90686 IIV4 VACC NO PRSV 0.5 ML IM: CPT | Performed by: NURSE PRACTITIONER

## 2017-10-06 PROCEDURE — 36415 COLL VENOUS BLD VENIPUNCTURE: CPT | Performed by: NURSE PRACTITIONER

## 2017-10-06 PROCEDURE — 90471 IMMUNIZATION ADMIN: CPT | Performed by: NURSE PRACTITIONER

## 2017-10-06 PROCEDURE — 85025 COMPLETE CBC W/AUTO DIFF WBC: CPT | Performed by: NURSE PRACTITIONER

## 2017-10-06 NOTE — MR AVS SNAPSHOT
After Visit Summary   10/6/2017    Joshua Braden    MRN: [id number]           Patient Information     Date Of Birth          1965        Visit Information        Provider Department      10/6/2017 10:00 AM Lisbeth Burns APRN CNP [facility]        Today's Diagnoses     Dizziness    -  1    Screening for lipoid disorders        Screening for diabetes mellitus        Need for prophylactic vaccination and inoculation against influenza          Care Instructions    CBC was normal.  Continue with rest, fluids and follow up if symptoms persist or worsen.  Return for fasting, screening labs when able and will be notified of those results.  Flu shot given.        Thank you for choosing [facility].  You may be receiving a survey in the mail from [employer] regarding your visit today.  Please take a few minutes to complete and return the survey to let us know how we are doing.      Our Clinic hours are:  Mondays    7:20 am - 7 pm  Tues -  Fri  7:20 am - 5 pm    Clinic Phone: [phone]    The clinic lab opens at 7:30 am Mon - Fri and appointments are required.    [facility]  Ph. [phone]  Monday-Thursday 8 am - 7pm  Tues/Wed/Fri 8 am - 5:30 pm                 Follow-ups after your visit        Future tests that were ordered for you today     Open Future Orders        Priority Expected Expires Ordered    Glucose Routine  10/6/2018 10/6/2017    Lipid panel reflex to direct LDL Routine  10/6/2018 10/6/2017            Who to contact     If you have questions or need follow up information about today's clinic visit or your schedule please contact [facility] directly at [phone].  Normal or non-critical lab and imaging results will be communicated to you by MyChart, letter or phone within 4 business days after the clinic has received the results. If you do not hear from us within 7 days, please contact the clinic through [employer] or  "phone. If you have a critical or abnormal lab result, we will notify you by phone as soon as possible.  Submit refill requests through [employer] or call your pharmacy and they will forward the refill request to us. Please allow 3 business days for your refill to be completed.          Additional Information About Your Visit        [employer] Information     [employer] lets you send messages to your doctor, view your test results, renew your prescriptions, schedule appointments and more. To sign up, go to [email or web address] . Click on \"Log in\" on the left side of the screen, which will take you to the Welcome page. Then click on \"Sign up Now\" on the right side of the page.     You will be asked to enter the access code listed below, as well as some personal information. Please follow the directions to create your username and password.     Your access code is: [email or web address]  Expires: 2018 10:43 AM     Your access code will  in 90 days. If you need help or a new code, please call your [facility] clinic or [phone].        Care EveryWhere ID     This is your Care EveryWhere ID. This could be used by other organizations to access your [facility] medical records  [id number]        Your Vitals Were     Pulse Temperature Respirations Height BMI (Body Mass Index)       67 97  F (36.1  C) (Oral) 16 5' 9.5\" (1.765 m) 25.18 kg/m2        Blood Pressure from Last 3 Encounters:   10/06/17 136/77   03/10/17 131/78   01/10/17 138/80    Weight from Last 3 Encounters:   10/06/17 173 lb (78.5 kg)   03/10/17 173 lb (78.5 kg)   01/10/17 166 lb (75.3 kg)              We Performed the Following     CBC with platelets differential     FLU VAC, SPLIT VIRUS IM > 3 YO (QUADRIVALENT) [26740]     Vaccine Administration, Initial [[id number]]        Primary Care Provider Office Phone # Fax #    HARDIK Lobato [phone] [address] [facility] [address]        Equal Access to Services     [facility] " AH: Jarred Gonzalez, wabreannada luqadaha, valeriata karitesh scottyadelamara, haydee pinonkumardaquan lynch. So [facility] [phone].    ATENCIÓN: Si habla español, tiene a jennings disposición servicios gratuitos de asistencia lingüística. Llame al [phone].    We comply with applicable federal civil rights laws and [place] laws. We do not discriminate on the basis of race, color, national origin, age, disability, sex, sexual orientation, or gender identity.            Thank you!     Thank you for choosing [facility]  for your care. Our goal is always to provide you with excellent care. Hearing back from our patients is one way we can continue to improve our services. Please take a few minutes to complete the written survey that you may receive in the mail after your visit with us. Thank you!             Your Updated Medication List - Protect others around you: Learn how to safely use, store and throw away your medicines at www.disposemymeds.org.      Notice  As of 10/6/2017 10:43 AM    You have not been prescribed any medications.

## 2017-10-06 NOTE — NURSING NOTE
"Chief Complaint   Patient presents with     Dizziness       Initial /77 (BP Location: Right arm, Patient Position: Chair, Cuff Size: Adult Regular)  Pulse 67  Temp 97  F (36.1  C) (Oral)  Resp 16  Ht 5' 9.5\" (1.765 m)  Wt 173 lb (78.5 kg)  BMI 25.18 kg/m2 Estimated body mass index is 25.18 kg/(m^2) as calculated from the following:    Height as of this encounter: 5' 9.5\" (1.765 m).    Weight as of this encounter: 173 lb (78.5 kg).  Medication Reconciliation: complete  "

## 2017-10-06 NOTE — PATIENT INSTRUCTIONS
CBC was normal.  Continue with rest, fluids and follow up if symptoms persist or worsen.  Return for fasting, screening labs when able and will be notified of those results.  Flu shot given.        Thank you for choosing [facility].  You may be receiving a survey in the mail from [employer] regarding your visit today.  Please take a few minutes to complete and return the survey to let us know how we are doing.      Our Clinic hours are:  Mondays    7:20 am - 7 pm  Tues -  Fri  7:20 am - 5 pm    Clinic Phone: [phone]    The clinic lab opens at 7:30 am Mon - Fri and appointments are required.    [facility]  Ph. [phone]  Monday-Thursday 8 am - 7pm  Tues/Wed/Fri 8 am - 5:30 pm

## 2017-10-06 NOTE — PROGRESS NOTES
"  SUBJECTIVE:   Joshua Braden is a 51 year old male who presents to clinic today for the following health issues:      Dizziness      Duration: 5-6 day     Description   Feeling faint:  YES- 1st 2 mornings of camping in a tent with a heater running.  Feeling like the surroundings are moving: YES- minor  Loss of consciousness or falls: no     Intensity:  mild    Accompanying signs and symptoms:   Nausea/vomitting: no   Palpitations: no   Weakness in arms or legs: no   Vision or speech changes: no-vision is a little cloudy   Ringing in ears (Tinnitus): no   Hearing loss related to dizziness: no   Other (fevers/chills/sweating/dyspnea): YES- chills, he took some Ibuprofen.    History (similar episodes/head trauma/previous evaluation/recent bleeding): None    Precipitating or alleviating factors (new meds/chemicals): was camping in the [facility] last weekend and was sleeping in a tent with a heater going and that is when his symptoms started.  Worse with activity/head movement: YES    Therapies tried and outcome: Ibuprofen and laying down            Problem list and histories reviewed & adjusted, as indicated.  Additional history: he has vague concerns of dizziness, seemed to be worse the morning of his camping trip in [facility] when he had a heater on in his tent. Reports he had ventilation.  He felt better as the day went on. That was one week ago. He states he doesn't feel as if room is spinning or that he's going to pass out, he just feels a little \"off\" sometimes when he moves around. No other specific URI complaint. No headaches, nausea, vision changes. He states he's otherwise feeling pretty good.  Colonoscopy scheduled for November.      Patient Active Problem List   Diagnosis     Murmur, cardiac     Past Surgical History:   Procedure Laterality Date     ENT SURGERY      adenoidectomy and PE tubes as a child     HERNIA REPAIR      bilateral inguinal 2010       Social History   Substance Use Topics " "    Smoking status: Never Smoker     Smokeless tobacco: Never Used     Alcohol use Yes      Comment: 3-5 per week     Family History   Problem Relation Age of Onset     Lung Cancer Father          No current outpatient prescriptions on file.     No Known Allergies  BP Readings from Last 3 Encounters:   10/06/17 136/77   03/10/17 131/78   01/10/17 138/80    Wt Readings from Last 3 Encounters:   10/06/17 173 lb (78.5 kg)   03/10/17 173 lb (78.5 kg)   01/10/17 166 lb (75.3 kg)                        Reviewed and updated as needed this visit by clinical staffTobacco  Allergies  Med Hx  Surg Hx  Fam Hx  Soc Hx      Reviewed and updated as needed this visit by Provider         10 point ROS of systems including Constitutional, Eyes, Respiratory, Cardiovascular, Gastroenterology, Genitourinary, Integumentary, Muscularskeletal, Psychiatric were all negative except for pertinent positives noted in my HPI.    OBJECTIVE:     /77 (BP Location: Right arm, Patient Position: Chair, Cuff Size: Adult Regular)  Pulse 67  Temp 97  F (36.1  C) (Oral)  Resp 16  Ht 5' 9.5\" (1.765 m)  Wt 173 lb (78.5 kg)  BMI 25.18 kg/m2  Body mass index is 25.18 kg/(m^2).  GENERAL: healthy, alert and no distress  HENT: ear canals and TM's normal, pharynx without erythema, no sinus tenderness  NECK: no adenopathy, no asymmetry  RESP: lungs clear to auscultation - no rales, rhonchi or wheezes  CV: regular rate and rhythm, murmur present  ABDOMEN: soft, nontender, no hepatosplenomegaly, no masses and bowel sounds normal  MS: no gross musculoskeletal defects noted  NEURO: cranial nerves grossly intact, negative Romberg's, heel/toe walking fine, heel to shin normal, negative Hallpike    Diagnostic Test Results:  Results for orders placed or performed in visit on 10/06/17 (from the past 24 hour(s))   CBC with platelets differential   Result Value Ref Range    WBC 7.9 4.0 - 11.0 10e9/L    RBC Count 5.37 4.4 - 5.9 10e12/L    Hemoglobin 15.9 13.3 " - 17.7 g/dL    Hematocrit 47.5 40.0 - 53.0 %    MCV 89 78 - 100 fl    MCH 29.6 26.5 - 33.0 pg    MCHC 33.5 31.5 - 36.5 g/dL    RDW 13.1 10.0 - 15.0 %    Platelet Count 221 150 - 450 10e9/L    Diff Method Automated Method     % Neutrophils 73.1 %    % Lymphocytes 17.6 %    % Monocytes 8.1 %    % Eosinophils 0.8 %    % Basophils 0.4 %    Absolute Neutrophil 5.8 1.6 - 8.3 10e9/L    Absolute Lymphocytes 1.4 0.8 - 5.3 10e9/L    Absolute Monocytes 0.6 0.0 - 1.3 10e9/L    Absolute Eosinophils 0.1 0.0 - 0.7 10e9/L    Absolute Basophils 0.0 0.0 - 0.2 10e9/L       ASSESSMENT/PLAN:             1. Dizziness    - CBC with platelets differential  Exam was normal, CBC normal. Would anticipate his vague concern of dizziness will resolve. Continue to monitor and return if symptoms persist or worsen.    2. Screening for lipoid disorders    - Lipid panel reflex to direct LDL; Future    3. Screening for diabetes mellitus    - Glucose; Future    4. Need for prophylactic vaccination and inoculation against influenza    - FLU VAC, SPLIT VIRUS IM > 3 YO (QUADRIVALENT) [77367]  - Vaccine Administration, Initial [08077]    See Patient Instructions  Patient Instructions   CBC was normal.  Continue with rest, fluids and follow up if symptoms persist or worsen.  Return for fasting, screening labs when able and will be notified of those results.  Flu shot given.        Thank you for choosing [facility].  You may be receiving a survey in the mail from [employer] regarding your visit today.  Please take a few minutes to complete and return the survey to let us know how we are doing.      Our Clinic hours are:  Mondays    7:20 am - 7 pm  Tues -  Fri  7:20 am - 5 pm    Clinic Phone: [phone]    The clinic lab opens at 7:30 am Mon - Fri and appointments are required.    [facility]  Ph. [phone]  Monday-Thursday 8 am - 7pm  Tues/Wed/Fri 8 am - 5:30 pm             HARDIK Lobato CNP  [facility]  CITY    Injectable Influenza Immunization Documentation    1.  Is the person to be vaccinated sick today?   No    2. Does the person to be vaccinated have an allergy to a component   of the vaccine?   No    3. Has the person to be vaccinated ever had a serious reaction   to influenza vaccine in the past?   No    4. Has the person to be vaccinated ever had Guillain-Barré syndrome?   No    Form completed by Minnie Young MA

## 2017-10-11 DIAGNOSIS — Z13.1 SCREENING FOR DIABETES MELLITUS: ICD-10-CM

## 2017-10-11 DIAGNOSIS — Z13.220 SCREENING FOR LIPOID DISORDERS: ICD-10-CM

## 2017-10-11 LAB
CHOLEST SERPL-MCNC: 242 MG/DL
GLUCOSE SERPL-MCNC: 91 MG/DL (ref 70–99)
HDLC SERPL-MCNC: 60 MG/DL
LDLC SERPL CALC-MCNC: 153 MG/DL
NONHDLC SERPL-MCNC: 182 MG/DL
TRIGL SERPL-MCNC: 146 MG/DL

## 2017-10-11 PROCEDURE — 80061 LIPID PANEL: CPT | Performed by: NURSE PRACTITIONER

## 2017-10-11 PROCEDURE — 36415 COLL VENOUS BLD VENIPUNCTURE: CPT | Performed by: NURSE PRACTITIONER

## 2017-10-11 PROCEDURE — 82947 ASSAY GLUCOSE BLOOD QUANT: CPT | Performed by: NURSE PRACTITIONER

## 2017-11-02 ENCOUNTER — ANESTHESIA EVENT (OUTPATIENT)
Dept: GASTROENTEROLOGY | Facility: CLINIC | Age: 52
End: 2017-11-02
Payer: COMMERCIAL

## 2017-11-02 NOTE — ANESTHESIA PREPROCEDURE EVALUATION
Anesthesia Evaluation     . Pt has had prior anesthetic. Type: General    No history of anesthetic complications          ROS/MED HX    ENT/Pulmonary:  - neg pulmonary ROS     Neurologic:  - neg neurologic ROS     Cardiovascular:  - neg cardiovascular ROS   (+) ----. : . . . :. valvular problems/murmurs .       METS/Exercise Tolerance:  >4 METS   Hematologic:  - neg hematologic  ROS       Musculoskeletal:  - neg musculoskeletal ROS       GI/Hepatic:  - neg GI/hepatic ROS       Renal/Genitourinary:  - ROS Renal section negative       Endo:  - neg endo ROS       Psychiatric:  - neg psychiatric ROS       Infectious Disease:  - neg infectious disease ROS       Malignancy:      - no malignancy   Other:    - neg other ROS                 Physical Exam  Normal systems: cardiovascular, pulmonary and dental    Airway   Mallampati: II  TM distance: >3 FB  Neck ROM: full    Dental     Cardiovascular       Pulmonary                     Anesthesia Plan      History & Physical Review      ASA Status:  1 .    NPO Status:  > 4 hours    Plan for MAC Reason for MAC:  Deep or markedly invasive procedure (G8)         Postoperative Care      Consents  Anesthetic plan, risks, benefits and alternatives discussed with:  Patient..                          .

## 2017-11-03 ENCOUNTER — HOSPITAL ENCOUNTER (OUTPATIENT)
Facility: CLINIC | Age: 52
Discharge: HOME | End: 2017-11-03
Attending: SURGERY | Admitting: SURGERY
Payer: COMMERCIAL

## 2017-11-03 ENCOUNTER — SURGERY (OUTPATIENT)
Age: 52
End: 2017-11-03

## 2017-11-03 ENCOUNTER — ANESTHESIA (OUTPATIENT)
Dept: GASTROENTEROLOGY | Facility: CLINIC | Age: 52
End: 2017-11-03
Payer: COMMERCIAL

## 2017-11-03 VITALS
WEIGHT: 216 LBS | TEMPERATURE: 98 F | RESPIRATION RATE: 16 BRPM | HEIGHT: 66 IN | OXYGEN SATURATION: 97 % | BODY MASS INDEX: 34.72 KG/M2 | DIASTOLIC BLOOD PRESSURE: 72 MMHG | SYSTOLIC BLOOD PRESSURE: 121 MMHG | HEART RATE: 74 BPM

## 2017-11-03 LAB — COLONOSCOPY: NORMAL

## 2017-11-03 PROCEDURE — 37000009 ZZH ANESTHESIA TECHNICAL FEE, EACH ADDTL 15 MIN: Performed by: SURGERY

## 2017-11-03 PROCEDURE — 25000125 ZZHC RX 250: Performed by: NURSE ANESTHETIST, CERTIFIED REGISTERED

## 2017-11-03 PROCEDURE — 88305 TISSUE EXAM BY PATHOLOGIST: CPT | Performed by: SURGERY

## 2017-11-03 PROCEDURE — 49585 ZZHC REPAIR UMBILICAL HERN,5+Y/O,REDUC: CPT | Mod: PT | Performed by: SURGERY

## 2017-11-03 PROCEDURE — 25000125 ZZHC RX 250: Performed by: SURGERY

## 2017-11-03 PROCEDURE — 25000128 H RX IP 250 OP 636: Performed by: SURGERY

## 2017-11-03 PROCEDURE — 45385 COLONOSCOPY W/LESION REMOVAL: CPT | Performed by: SURGERY

## 2017-11-03 PROCEDURE — 88305 TISSUE EXAM BY PATHOLOGIST: CPT | Mod: 26 | Performed by: SURGERY

## 2017-11-03 PROCEDURE — 37000008 ZZH ANESTHESIA TECHNICAL FEE, 1ST 30 MIN: Performed by: SURGERY

## 2017-11-03 PROCEDURE — 25000128 H RX IP 250 OP 636: Performed by: NURSE ANESTHETIST, CERTIFIED REGISTERED

## 2017-11-03 RX ORDER — PROPOFOL 10 MG/ML
INJECTION, EMULSION INTRAVENOUS CONTINUOUS PRN
Status: DISCONTINUED | OUTPATIENT
Start: 2017-11-03 | End: 2017-11-03

## 2017-11-03 RX ORDER — GLYCOPYRROLATE 0.2 MG/ML
INJECTION, SOLUTION INTRAMUSCULAR; INTRAVENOUS PRN
Status: DISCONTINUED | OUTPATIENT
Start: 2017-11-03 | End: 2017-11-03

## 2017-11-03 RX ORDER — ONDANSETRON 2 MG/ML
4 INJECTION INTRAMUSCULAR; INTRAVENOUS
Status: DISCONTINUED | OUTPATIENT
Start: 2017-11-03 | End: 2017-11-03 | Stop reason: HOSPADM

## 2017-11-03 RX ORDER — SODIUM CHLORIDE, SODIUM LACTATE, POTASSIUM CHLORIDE, CALCIUM CHLORIDE 600; 310; 30; 20 MG/100ML; MG/100ML; MG/100ML; MG/100ML
INJECTION, SOLUTION INTRAVENOUS CONTINUOUS
Status: DISCONTINUED | OUTPATIENT
Start: 2017-11-03 | End: 2017-11-03 | Stop reason: HOSPADM

## 2017-11-03 RX ORDER — PROPOFOL 10 MG/ML
INJECTION, EMULSION INTRAVENOUS PRN
Status: DISCONTINUED | OUTPATIENT
Start: 2017-11-03 | End: 2017-11-03

## 2017-11-03 RX ORDER — LIDOCAINE 40 MG/G
CREAM TOPICAL
Status: DISCONTINUED | OUTPATIENT
Start: 2017-11-03 | End: 2017-11-03 | Stop reason: HOSPADM

## 2017-11-03 RX ORDER — LIDOCAINE HYDROCHLORIDE 10 MG/ML
INJECTION, SOLUTION EPIDURAL; INFILTRATION; INTRACAUDAL; PERINEURAL PRN
Status: DISCONTINUED | OUTPATIENT
Start: 2017-11-03 | End: 2017-11-03

## 2017-11-03 RX ADMIN — GLYCOPYRROLATE 0.2 MG: 0.2 INJECTION, SOLUTION INTRAMUSCULAR; INTRAVENOUS at 08:41

## 2017-11-03 RX ADMIN — PROPOFOL 200 MCG/KG/MIN: 10 INJECTION, EMULSION INTRAVENOUS at 08:41

## 2017-11-03 RX ADMIN — SODIUM CHLORIDE, POTASSIUM CHLORIDE, SODIUM LACTATE AND CALCIUM CHLORIDE: 600; 310; 30; 20 INJECTION, SOLUTION INTRAVENOUS at 08:37

## 2017-11-03 RX ADMIN — LIDOCAINE HYDROCHLORIDE 1 ML: 10 INJECTION, SOLUTION EPIDURAL; INFILTRATION; INTRACAUDAL; PERINEURAL at 08:37

## 2017-11-03 RX ADMIN — LIDOCAINE HYDROCHLORIDE 50 MG: 10 INJECTION, SOLUTION EPIDURAL; INFILTRATION; INTRACAUDAL; PERINEURAL at 08:41

## 2017-11-03 RX ADMIN — PROPOFOL 100 MG: 10 INJECTION, EMULSION INTRAVENOUS at 08:41

## 2017-11-03 NOTE — ANESTHESIA CARE TRANSFER NOTE
Patient: Joshua Braden    Procedure(s):  Colonoscopy - Wound Class: II-Clean Contaminated    Diagnosis: screening  Diagnosis Additional Information: No value filed.    Anesthesia Type:   No value filed.     Note:    Patient transferred to:Phase II  Handoff Report: Identifed the Patient, Identified the Reponsible Provider, Reviewed the pertinent medical history, Discussed the surgical course, Reviewed Intra-OP anesthesia mangement and issues during anesthesia, Set expectations for post-procedure period and Allowed opportunity for questions and acknowledgement of understanding      Vitals: (Last set prior to Anesthesia Care Transfer)    CRNA VITALS  11/3/2017 0840 - 11/3/2017 0910      11/3/2017             Pulse: 53    SpO2: 98 %                Electronically Signed By: HARDIK Mullins CRNA  November 3, 2017  9:10 AM

## 2017-11-03 NOTE — H&P
"51 year old year old male here for colonoscopy for screening.    Patient Active Problem List   Diagnosis     Murmur, cardiac       Past Medical History:   Diagnosis Date     Cardiac murmur        Past Surgical History:   Procedure Laterality Date     ENT SURGERY      adenoidectomy and PE tubes as a child     HERNIA REPAIR      b[facility] inguinal 2010       [facility]    No current outpatient prescriptions on file.       Allergies   Allergen Reactions     Nka [No Known Allergies]        Pt reports that he has never smoked. He has never used smokeless tobacco. He reports that he drinks alcohol. He reports that he does not use illicit drugs.    Exam:  /86 (BP Location: Right arm)  Pulse 74  Temp 98.5  F (36.9  C) (Oral)  Resp 16  Ht 1.676 m (5' 6\")  Wt 98 kg (216 lb)  SpO2 99%  BMI 34.86 kg/m2    Awake, Alert OX3  Lungs - CTA bilaterally  CV - RRR, no murmurs, distal pulses intact  Abd - soft, non-distended, non-tender, +BS  Extr - No cyanosis or edema    A/P 51 year old year old male in need of colonoscopy for screening. Risks, benefits, alternatives, and complications were discussed including the possibility of perforation and the patient agreed to proceed    Julius Pace MD   "

## 2017-11-03 NOTE — ANESTHESIA POSTPROCEDURE EVALUATION
Patient: Joshua Braden    Procedure(s):  Colonoscopy - Wound Class: II-Clean Contaminated    Diagnosis:screening  Diagnosis Additional Information: No value filed.    Anesthesia Type:  No value filed.    Note:  Anesthesia Post Evaluation    Patient location during evaluation: Bedside  Patient participation: Able to fully participate in evaluation  Level of consciousness: awake and alert  Pain management: adequate  Airway patency: patent  Cardiovascular status: acceptable  Respiratory status: acceptable  Hydration status: acceptable  PONV: none     Anesthetic complications: None          Last vitals:  Vitals:    11/03/17 0801 11/03/17 0812   BP: 140/86 139/87   Pulse: 74    Resp: 16 16   Temp: 36.9  C (98.5  F) 36.7  C (98  F)   SpO2: 99% 98%         Electronically Signed By: HARDIK Mullins CRNA  November 3, 2017  9:12 AM

## 2017-11-03 NOTE — BRIEF OP NOTE
[facility]   Brief Operative Note    Pre-operative diagnosis: screening   Post-operative diagnosis single polyp, otherwise normal   Procedure: Procedure(s):  Colonoscopy - Wound Class: II-Clean Contaminated   Surgeon(s): Surgeon(s) and Role:     * Julius Pace MD - Primary   Estimated blood loss: * No values recorded between 11/3/2017 12:00 AM and 11/3/2017  9:00 AM *    Specimens: * No specimens in log *   Findings: 1. Single tiny polyp transverse colon  2. Colon otherwise normal

## 2017-11-07 LAB — COPATH REPORT: NORMAL

## 2017-11-08 PROBLEM — D12.6 TUBULAR ADENOMA OF COLON: Status: ACTIVE | Noted: 2017-11-08

## 2020-01-14 ENCOUNTER — OFFICE VISIT (OUTPATIENT)
Dept: FAMILY MEDICINE | Facility: CLINIC | Age: 55
End: 2020-01-14
Payer: COMMERCIAL

## 2020-01-14 VITALS
SYSTOLIC BLOOD PRESSURE: 136 MMHG | TEMPERATURE: 98.4 F | HEIGHT: 69 IN | DIASTOLIC BLOOD PRESSURE: 70 MMHG | RESPIRATION RATE: 16 BRPM | HEART RATE: 69 BPM | OXYGEN SATURATION: 97 % | WEIGHT: 160 LBS | BODY MASS INDEX: 23.7 KG/M2

## 2020-01-14 DIAGNOSIS — Z13.1 SCREENING FOR DIABETES MELLITUS: Primary | ICD-10-CM

## 2020-01-14 DIAGNOSIS — R20.2 NUMBNESS AND TINGLING IN LEFT ARM: ICD-10-CM

## 2020-01-14 DIAGNOSIS — R63.1 INCREASED THIRST: ICD-10-CM

## 2020-01-14 DIAGNOSIS — R20.0 NUMBNESS AND TINGLING IN LEFT ARM: ICD-10-CM

## 2020-01-14 DIAGNOSIS — Z23 NEED FOR PROPHYLACTIC VACCINATION AND INOCULATION AGAINST INFLUENZA: ICD-10-CM

## 2020-01-14 LAB — HBA1C MFR BLD: 5.1 % (ref 0–5.6)

## 2020-01-14 PROCEDURE — 99214 OFFICE O/P EST MOD 30 MIN: CPT | Performed by: NURSE PRACTITIONER

## 2020-01-14 PROCEDURE — 36415 COLL VENOUS BLD VENIPUNCTURE: CPT | Performed by: NURSE PRACTITIONER

## 2020-01-14 PROCEDURE — 90471 IMMUNIZATION ADMIN: CPT | Performed by: NURSE PRACTITIONER

## 2020-01-14 PROCEDURE — 90682 RIV4 VACC RECOMBINANT DNA IM: CPT | Performed by: NURSE PRACTITIONER

## 2020-01-14 PROCEDURE — 83036 HEMOGLOBIN GLYCOSYLATED A1C: CPT | Performed by: NURSE PRACTITIONER

## 2020-01-14 ASSESSMENT — MIFFLIN-ST. JEOR: SCORE: 1555.1

## 2020-01-14 NOTE — PATIENT INSTRUCTIONS
Wear wear splint nightly on left for 2 weeks. Return if persistent concerns in left forearm.  Lab test was fine, no signs of diabetes.      Our Clinic hours are:  Mondays    7:20 am - 7 pm  Tues -  Fri  7:20 am - 5 pm    Clinic Phone: [phone]    The clinic lab opens at 7:30 am Mon - Fri and appointments are required.    [facility]. [phone]  Monday  8 am - 7pm  Tues - Fri 8 am - 5:30 pm

## 2020-01-14 NOTE — PROGRESS NOTES
"Subjective     Joshua Braden is a 54 year old male who presents to clinic today for the following health issues:    HPI   Left arm tingles. Is always thirsty. Wants to be checked for diabetes.    His wife states he drinks all the time, always has a beverage in his hand. He states he drinks a pop every morning and then does drink water or sometimes beer.  He denies increase in urination or weight loss. No fatigue. No FMH of diabetes.  He also has left forearm tingling on occasion. He has carpal tunnel issues with right hand and this feels similar to that.  Will get flu shot today.  No other concerns.        Patient Active Problem List   Diagnosis     Murmur, cardiac     Tubular adenoma of colon     Past Surgical History:   Procedure Laterality Date     ENT SURGERY      adenoidectomy and PE tubes as a child     HERNIA REPAIR      bilateral inguinal 2010       Social History     Tobacco Use     Smoking status: Never Smoker     Smokeless tobacco: Never Used   Substance Use Topics     Alcohol use: Yes     Comment: 3-5 per week     Family History   Problem Relation Age of Onset     Dementia Mother      Lung Cancer Father          No current outpatient medications on file.     Allergies   Allergen Reactions     Nka [No Known Allergies]      Recent Labs   Lab Test 01/14/20  1445 10/11/17  0825   A1C 5.1  --    LDL  --  153*   HDL  --  60   TRIG  --  146      BP Readings from Last 3 Encounters:   01/14/20 136/70   11/03/17 121/72   10/06/17 136/77                       Reviewed and updated as needed this visit by Provider         Review of Systems   ROS COMP: Constitutional, HEENT, cardiovascular, pulmonary, gi and gu systems are negative, except as otherwise noted.      Objective    /70 (BP Location: Right arm, Patient Position: Chair, Cuff Size: Adult Regular)   Pulse 69   Temp 98.4  F (36.9  C) (Oral)   Resp 16   Ht 1.759 m (5' 9.25\")   Wt 72.6 kg (160 lb)   SpO2 97%   BMI 23.46 kg/m    Body mass index is " 23.46 kg/m .  Physical Exam   GENERAL: healthy, alert and no distress  NECK: no adenopathy, no asymmetry  RESP: lungs clear to auscultation   CV: regular rate and rhythm,   MS: no gross musculoskeletal defects noted      Diagnostic Test Results:  Labs reviewed in Epic  Results for orders placed or performed in visit on 01/14/20 (from the past 24 hour(s))   Hemoglobin A1c   Result Value Ref Range    Hemoglobin A1C 5.1 0 - 5.6 %           Assessment & Plan     1. Screening for diabetes mellitus    - Hemoglobin A1c    2. Increased thirst    No signs of diabetes. This could just be his habit to drink a lot of fluids. Cautioned against drinking caffeine beverages.      3. Numbness and tingling in left arm    Likely nerve, carpal tunnel, see below for recommendations.       See Patient Instructions  Patient Instructions   Wear wear splint nightly on left for 2 weeks. Return if persistent concerns in left forearm.  Lab test was fine, no signs of diabetes.      Our Clinic hours are:  Mondays    7:20 am - 7 pm  Tues -  Fri  7:20 am - 5 pm    Clinic Phone: [phone]    The clinic lab opens at 7:30 am Mon - Fri and appointments are required.    [facility]  Ph. [phone]  Monday  8 am - 7pm  Tues - Fri 8 am - 5:30 pm             Return in about 2 weeks (around 1/28/2020) for or sooner if symptoms persist or worsen.    HARDIK Lobato [facility]

## 2020-02-03 ENCOUNTER — TELEPHONE (OUTPATIENT)
Dept: FAMILY MEDICINE | Facility: CLINIC | Age: 55
End: 2020-02-03

## 2020-02-03 NOTE — TELEPHONE ENCOUNTER
Reason for Call:  Form, our goal is to have forms completed with 72 hours, however, some forms may require a visit or additional information.    Type of letter, form or note:  medical - Attending Physician report    Who is the form from?: Patient    Where did the form come from: Patient or family brought in       What clinic location was the form placed at?: [facility]    Where the form was placed: Form's Box/Folder    What number is listed as a contact on the form?: [phone]       Additional comments:     Call taken on 2/3/2020 at 3:06 PM by Shelbie Kirk

## 2020-02-05 NOTE — TELEPHONE ENCOUNTER
Form signed and faxed to [phone].  [facility]

## 2020-12-27 ENCOUNTER — HEALTH MAINTENANCE LETTER (OUTPATIENT)
Age: 55
End: 2020-12-27

## 2021-05-24 ENCOUNTER — RECORDS - HEALTHEAST (OUTPATIENT)
Dept: ADMINISTRATIVE | Facility: CLINIC | Age: 56
End: 2021-05-24

## 2021-06-11 ENCOUNTER — OFFICE VISIT (OUTPATIENT)
Dept: FAMILY MEDICINE | Facility: CLINIC | Age: 56
End: 2021-06-11
Payer: COMMERCIAL

## 2021-06-11 VITALS
RESPIRATION RATE: 16 BRPM | WEIGHT: 161 LBS | HEIGHT: 70 IN | OXYGEN SATURATION: 98 % | TEMPERATURE: 98.3 F | DIASTOLIC BLOOD PRESSURE: 60 MMHG | BODY MASS INDEX: 23.05 KG/M2 | HEART RATE: 72 BPM | SYSTOLIC BLOOD PRESSURE: 122 MMHG

## 2021-06-11 DIAGNOSIS — Z13.220 SCREENING FOR LIPOID DISORDERS: ICD-10-CM

## 2021-06-11 DIAGNOSIS — H81.10 BENIGN PAROXYSMAL POSITIONAL VERTIGO, UNSPECIFIED LATERALITY: ICD-10-CM

## 2021-06-11 DIAGNOSIS — Z11.4 SCREENING FOR HIV (HUMAN IMMUNODEFICIENCY VIRUS): ICD-10-CM

## 2021-06-11 DIAGNOSIS — Q23.81 BICUSPID AORTIC VALVE: ICD-10-CM

## 2021-06-11 DIAGNOSIS — Z11.59 ENCOUNTER FOR HEPATITIS C SCREENING TEST FOR LOW RISK PATIENT: ICD-10-CM

## 2021-06-11 DIAGNOSIS — I83.812 VARICOSE VEINS OF LEFT LOWER EXTREMITY WITH PAIN: ICD-10-CM

## 2021-06-11 DIAGNOSIS — Z00.00 ROUTINE GENERAL MEDICAL EXAMINATION AT A HEALTH CARE FACILITY: Primary | ICD-10-CM

## 2021-06-11 DIAGNOSIS — Z13.1 SCREENING FOR DIABETES MELLITUS: ICD-10-CM

## 2021-06-11 LAB
ANION GAP SERPL CALCULATED.3IONS-SCNC: 6 MMOL/L (ref 3–14)
BUN SERPL-MCNC: 14 MG/DL (ref 7–30)
CALCIUM SERPL-MCNC: 8.7 MG/DL (ref 8.5–10.1)
CHLORIDE SERPL-SCNC: 101 MMOL/L (ref 94–109)
CHOLEST SERPL-MCNC: 263 MG/DL
CO2 SERPL-SCNC: 30 MMOL/L (ref 20–32)
CREAT SERPL-MCNC: 1.2 MG/DL (ref 0.66–1.25)
GFR SERPL CREATININE-BSD FRML MDRD: 67 ML/MIN/{1.73_M2}
GLUCOSE SERPL-MCNC: 82 MG/DL (ref 70–99)
HDLC SERPL-MCNC: 80 MG/DL
LDLC SERPL CALC-MCNC: 146 MG/DL
NONHDLC SERPL-MCNC: 183 MG/DL
POTASSIUM SERPL-SCNC: 4.9 MMOL/L (ref 3.4–5.3)
SODIUM SERPL-SCNC: 137 MMOL/L (ref 133–144)
TRIGL SERPL-MCNC: 187 MG/DL

## 2021-06-11 PROCEDURE — 36415 COLL VENOUS BLD VENIPUNCTURE: CPT | Performed by: NURSE PRACTITIONER

## 2021-06-11 PROCEDURE — 99396 PREV VISIT EST AGE 40-64: CPT | Performed by: NURSE PRACTITIONER

## 2021-06-11 PROCEDURE — 99213 OFFICE O/P EST LOW 20 MIN: CPT | Mod: 25 | Performed by: NURSE PRACTITIONER

## 2021-06-11 PROCEDURE — 80061 LIPID PANEL: CPT | Performed by: NURSE PRACTITIONER

## 2021-06-11 PROCEDURE — 86803 HEPATITIS C AB TEST: CPT | Performed by: NURSE PRACTITIONER

## 2021-06-11 PROCEDURE — 87389 HIV-1 AG W/HIV-1&-2 AB AG IA: CPT | Performed by: NURSE PRACTITIONER

## 2021-06-11 PROCEDURE — 80048 BASIC METABOLIC PNL TOTAL CA: CPT | Performed by: NURSE PRACTITIONER

## 2021-06-11 ASSESSMENT — MIFFLIN-ST. JEOR: SCORE: 1566.54

## 2021-06-11 NOTE — PROGRESS NOTES
3  SUBJECTIVE:   CC: Joshua Braden is an 55 year old male who presents for preventive health visit.       Patient has been advised of split billing requirements and indicates understanding: Yes  Healthy Habits:    Do you get at least three servings of calcium containing foods daily (dairy, green leafy vegetables, etc.)? yes    Amount of exercise or daily activities, outside of work: 7 day(s) per week    Problems taking medications regularly No    Medication side effects: No    Have you had an eye exam in the past two years? no    Do you see a dentist twice per year? no    Do you have sleep apnea, excessive snoring or daytime drowsiness?no      Dizziness  Onset: about 6 weeks     Description:   Do you feel faint:  no   Does it feel like the surroundings (bed, room) are moving: YES  Unsteady/off balance: YES  Have you passed out or fallen: no     Intensity: moderate    Progression of Symptoms:  intermittent    Accompanying Signs & Symptoms:  Heart palpitations: no   Nausea, vomiting: no   Weakness in arms or legs: YES  Left arm Fatigue: no   Vision or speech changes: no   Ringing in ears (Tinnitus): no   Hearing Loss: no     History:   Head trauma/concussion hx: no   Previous similar symptoms: YES- 3 years ago   Recent bleeding history: no     Precipitating factors:   Worse with activity or head movement: YES  Any new medications (BP?): no   Alcohol/drug abuse/withdrawal: no     Alleviating factors:   Does staying in a fixed position give relief:  YES    Therapies Tried and outcome: none    Today's PHQ-2 Score:   PHQ-2 ( 1999 [employer]) 6/11/2021 1/14/2020   Q1: Little interest or pleasure in doing things 0 0   Q2: Feeling down, depressed or hopeless 0 0   PHQ-2 Score 0 0       Abuse: Current or Past(Physical, Sexual or Emotional)- No  Do you feel safe in your environment? Yes    Have you ever done Advance Care Planning? (For example, a Health Directive, POLST, or a discussion with a medical provider or your loved  ones about your wishes): No, advance care planning information given to patient to review.  Patient plans to discuss their wishes with loved ones or provider.      Social History     Tobacco Use     Smoking status: Never Smoker     Smokeless tobacco: Never Used   Substance Use Topics     Alcohol use: Yes     Comment: 3-5 per week     If you drink alcohol do you typically have >3 drinks per day or >7 drinks per week? No                      Last PSA: No results found for: PSA    Reviewed orders with patient. Reviewed health maintenance and updated orders accordingly - Yes  BP Readings from Last 3 Encounters:   06/11/21 122/60   01/14/20 136/70   11/03/17 121/72    Wt Readings from Last 3 Encounters:   06/11/21 73 kg (161 lb)   01/14/20 72.6 kg (160 lb)   11/03/17 98 kg (216 lb)                  Patient Active Problem List   Diagnosis     Murmur, cardiac     Tubular adenoma of colon     Past Surgical History:   Procedure Laterality Date     ENT SURGERY      adenoidectomy and PE tubes as a child     HERNIA REPAIR      bilateral inguinal 2010       Social History     Tobacco Use     Smoking status: Never Smoker     Smokeless tobacco: Never Used   Substance Use Topics     Alcohol use: Yes     Comment: 3-5 per week     Family History   Problem Relation Age of Onset     Dementia Mother      Lung Cancer Father          No current outpatient medications on file.     Allergies   Allergen Reactions     Nka [No Known Allergies]      Recent Labs   Lab Test 01/14/20  1445 10/11/17  0825   A1C 5.1  --    LDL  --  153*   HDL  --  60   TRIG  --  146        Reviewed and updated as needed this visit by clinical staff  Tobacco  Allergies  Meds   Med Hx  Surg Hx  Fam Hx  Soc Hx        Reviewed and updated as needed this visit by Provider                Past Medical History:   Diagnosis Date     Cardiac murmur       Past Surgical History:   Procedure Laterality Date     ENT SURGERY      adenoidectomy and PE tubes as a child      "HERNIA REPAIR      bilateral inguinal 2010       ROS:  CONSTITUTIONAL: NEGATIVE for fever, chills, change in weight  INTEGUMENTARY/SKIN: NEGATIVE for worrisome rashes, moles or lesions  EYES: NEGATIVE for vision changes or irritation  ENT: NEGATIVE for ear, mouth and throat problems  RESP: NEGATIVE for significant cough or SOB  CV: NEGATIVE for chest pain, palpitations or peripheral edema  GI: NEGATIVE for nausea, abdominal pain, heartburn, or change in bowel habits   male: negative for dysuria, hematuria, decreased urinary stream, erectile dysfunction, urethral discharge  MUSCULOSKELETAL: NEGATIVE for significant arthralgias or myalgia  NEURO: NEGATIVE for weakness, dizziness or paresthesias  PSYCHIATRIC: NEGATIVE for changes in mood or affect    OBJECTIVE:   /60 (BP Location: Right arm, Patient Position: Chair, Cuff Size: Adult Regular)   Pulse 72   Temp 98.3  F (36.8  C)   Resp 16   Ht 1.778 m (5' 10\")   Wt 73 kg (161 lb)   SpO2 98%   BMI 23.10 kg/m    EXAM:  GENERAL: healthy, alert and no distress  EYES: Eyes grossly normal to inspection, PERRL and conjunctivae and sclerae normal  HENT: ear canals and TM's normal, nose and mouth without ulcers or lesions  NECK: no adenopathy, no asymmetry, masses, or scars and thyroid normal to palpation  RESP: lungs clear to auscultation - no rales, rhonchi or wheezes  CV: regular rate and rhythm, normal S1 S2, no S3 or S4, very mild murmur, click or rub, no peripheral edema and peripheral pulses strong  ABDOMEN: soft, nontender, no hepatosplenomegaly, no masses and bowel sounds normal  MS: no gross musculoskeletal defects noted, no edema, enlarged varicose veins on left lower leg, no inflammation noted  SKIN: no suspicious lesions or rashes  NEURO: Normal strength and tone, mentation intact and speech normal  PSYCH: mentation appears normal, affect normal/bright    Diagnostic Test Results:  Labs reviewed in Epic  No results found for this or any previous visit " (from the past 24 hour(s)).    ASSESSMENT/PLAN:   1. Routine general medical examination at a health care facility      2. Benign paroxysmal positional vertigo, unspecified laterality    - PHYSICAL THERAPY REFERRAL; Future  No symptoms of concern, will send to PT for head maneuvers and education. If persisting or worsening, consider imaging.  3. Bicuspid aortic valve    - Echocardiogram Complete; Future  Will update echo. No symptoms of concern and mild murmur heard.    4. Screening for lipoid disorders    - Lipid panel reflex to direct LDL Non-fasting    5. Screening for diabetes mellitus    - Basic metabolic panel  (Ca, Cl, CO2, Creat, Gluc, K, Na, BUN)    6. Screening for HIV (human immunodeficiency virus)    - HIV Antigen Antibody Combo    7. Encounter for hepatitis C screening test for low risk patient    - Hepatitis C antibody    8. Varicose veins of left lower extremity with pain    - VASCULAR SURGERY REFERRAL; Future  Left lower leg causing him discomfort, will meet with vascular to review options for treatment. Compression stockings.    Patient Instructions   Call insurance about Shingles vaccination.  Will be notified of pending labs.  See PT for vertigo.  Call to schedule echocardiogram, [phone].  Follow up with vascular about varicose veins.      Preventive Health Recommendations  Male Ages 50 - 64    Yearly exam:             See your health care provider every year in order to  o   Review health changes.   o   Discuss preventive care.    o   Review your medicines if your doctor has prescribed any.     Have a cholesterol test every 5 years, or more frequently if you are at risk for high cholesterol/heart disease.     Have a diabetes test (fasting glucose) every three years. If you are at risk for diabetes, you should have this test more often.     Have a colonoscopy at age 50, or have a yearly FIT test (stool test). These exams will check for colon cancer.      Talk with your health care provider  "about whether or not a prostate cancer screening test (PSA) is right for you.    You should be tested each year for STDs (sexually transmitted diseases), if you re at risk.     Shots: Get a flu shot each year. Get a tetanus shot every 10 years.     Nutrition:    Eat at least 5 servings of fruits and vegetables daily.     Eat whole-grain bread, whole-wheat pasta and brown rice instead of white grains and rice.     Get adequate Calcium and Vitamin D.     Lifestyle    Exercise for at least 150 minutes a week (30 minutes a day, 5 days a week). This will help you control your weight and prevent disease.     Limit alcohol to one drink per day.     No smoking.     Wear sunscreen to prevent skin cancer.     See your dentist every six months for an exam and cleaning.     See your eye doctor every 1 to 2 years.        COUNSELING:  Reviewed preventive health counseling, as reflected in patient instructions       Regular exercise       Healthy diet/nutrition       Consider Hep C screening for all patients one time for ages 18-79 years       HIV screeninx in teen years, 1x in adult years, and at intervals if high risk       Colon cancer screening    Estimated body mass index is 23.1 kg/m  as calculated from the following:    Height as of this encounter: 1.778 m (5' 10\").    Weight as of this encounter: 73 kg (161 lb).        He reports that he has never smoked. He has never used smokeless tobacco.      Counseling Resources:  ATP IV Guidelines  Pooled Cohorts Equation Calculator  FRAX Risk Assessment  ICSI Preventive Guidelines  Dietary Guidelines for Americans, 2010  USDA's MyPlate  ASA Prophylaxis  Lung CA Screening    HARDIK Lobato CNP  [facility]  "

## 2021-06-11 NOTE — PATIENT INSTRUCTIONS
Call insurance about Shingles vaccination.  Will be notified of pending labs.  See PT for vertigo.  Call to schedule echocardiogram, [phone].  Follow up with vascular about varicose veins.      Preventive Health Recommendations  Male Ages 50 - 64    Yearly exam:             See your health care provider every year in order to  o   Review health changes.   o   Discuss preventive care.    o   Review your medicines if your doctor has prescribed any.     Have a cholesterol test every 5 years, or more frequently if you are at risk for high cholesterol/heart disease.     Have a diabetes test (fasting glucose) every three years. If you are at risk for diabetes, you should have this test more often.     Have a colonoscopy at age 50, or have a yearly FIT test (stool test). These exams will check for colon cancer.      Talk with your health care provider about whether or not a prostate cancer screening test (PSA) is right for you.    You should be tested each year for STDs (sexually transmitted diseases), if you re at risk.     Shots: Get a flu shot each year. Get a tetanus shot every 10 years.     Nutrition:    Eat at least 5 servings of fruits and vegetables daily.     Eat whole-grain bread, whole-wheat pasta and brown rice instead of white grains and rice.     Get adequate Calcium and Vitamin D.     Lifestyle    Exercise for at least 150 minutes a week (30 minutes a day, 5 days a week). This will help you control your weight and prevent disease.     Limit alcohol to one drink per day.     No smoking.     Wear sunscreen to prevent skin cancer.     See your dentist every six months for an exam and cleaning.     See your eye doctor every 1 to 2 years.

## 2021-06-12 LAB
HCV AB SERPL QL IA: NONREACTIVE
HIV 1+2 AB+HIV1 P24 AG SERPL QL IA: NONREACTIVE

## 2021-06-25 ENCOUNTER — HOSPITAL ENCOUNTER (OUTPATIENT)
Dept: PHYSICAL THERAPY | Facility: CLINIC | Age: 56
Setting detail: THERAPIES SERIES
End: 2021-06-25
Attending: NURSE PRACTITIONER
Payer: COMMERCIAL

## 2021-06-25 DIAGNOSIS — H81.10 BENIGN PAROXYSMAL POSITIONAL VERTIGO, UNSPECIFIED LATERALITY: ICD-10-CM

## 2021-06-25 PROCEDURE — 97161 PT EVAL LOW COMPLEX 20 MIN: CPT | Mod: GP | Performed by: PHYSICAL THERAPIST

## 2021-06-25 PROCEDURE — 95992 CANALITH REPOSITIONING PROC: CPT | Mod: GP | Performed by: PHYSICAL THERAPIST

## 2021-06-29 ENCOUNTER — HOSPITAL ENCOUNTER (OUTPATIENT)
Dept: PHYSICAL THERAPY | Facility: CLINIC | Age: 56
Setting detail: THERAPIES SERIES
End: 2021-06-29
Attending: NURSE PRACTITIONER
Payer: COMMERCIAL

## 2021-06-29 PROCEDURE — 95992 CANALITH REPOSITIONING PROC: CPT | Mod: GP | Performed by: PHYSICAL THERAPIST

## 2021-07-07 ENCOUNTER — HOSPITAL ENCOUNTER (OUTPATIENT)
Dept: CARDIOLOGY | Facility: CLINIC | Age: 56
Discharge: HOME | End: 2021-07-07
Attending: NURSE PRACTITIONER | Admitting: NURSE PRACTITIONER
Payer: COMMERCIAL

## 2021-07-07 DIAGNOSIS — Q23.81 BICUSPID AORTIC VALVE: ICD-10-CM

## 2021-07-07 LAB — LVEF ECHO: NORMAL

## 2021-07-07 PROCEDURE — 93306 TTE W/DOPPLER COMPLETE: CPT

## 2021-07-07 PROCEDURE — 93306 TTE W/DOPPLER COMPLETE: CPT | Mod: 26 | Performed by: INTERNAL MEDICINE

## 2021-07-08 ENCOUNTER — TELEPHONE (OUTPATIENT)
Dept: FAMILY MEDICINE | Facility: CLINIC | Age: 56
End: 2021-07-08

## 2021-09-30 ENCOUNTER — DOCUMENTATION ONLY (OUTPATIENT)
Dept: PHYSICAL THERAPY | Facility: CLINIC | Age: 56
End: 2021-09-30

## 2021-09-30 NOTE — PROGRESS NOTES
Outpatient Physical Therapy Discharge Note     Patient: Joshua Braden  : 1965    Beginning/End Dates of Reporting Period:  2021 to 2021 (Total of 2 visits)    Referring Provider: Lisbeth Burns    Diagnosis: Benign paroxysmal posit-  ional vertigo, unspecif-  ied laterality     Client Self Report:  (From date of last visit)  No symptoms over the  weekend. But had sympto-  ms this morning when he  was tilting his head  upwards.     Objective Measurements: (From date of last visit)  [place] right - Upbeating right torsional    Treatment Has Consisted Of:  Canalith repositioning manouvers    Goals:  Goals had not been met at time of last visit.    Plan:  Discharge from therapy.    Discharge:    Reason for Discharge: Patient has failed to schedule further appointments.    Equipment Issued: None    Discharge Plan: Patient to continue home program.    Ino St, PT, DPT

## 2021-10-09 ENCOUNTER — HEALTH MAINTENANCE LETTER (OUTPATIENT)
Age: 56
End: 2021-10-09

## 2021-12-16 ENCOUNTER — MYC MEDICAL ADVICE (OUTPATIENT)
Dept: FAMILY MEDICINE | Facility: CLINIC | Age: 56
End: 2021-12-16
Payer: COMMERCIAL

## 2022-09-11 ENCOUNTER — HEALTH MAINTENANCE LETTER (OUTPATIENT)
Age: 57
End: 2022-09-11

## 2023-10-07 ENCOUNTER — HEALTH MAINTENANCE LETTER (OUTPATIENT)
Age: 58
End: 2023-10-07

## 2023-12-10 ENCOUNTER — E-VISIT (OUTPATIENT)
Dept: FAMILY MEDICINE | Facility: CLINIC | Age: 58
End: 2023-12-10

## 2023-12-10 DIAGNOSIS — Z71.84 TRAVEL ADVICE ENCOUNTER: Primary | ICD-10-CM

## 2023-12-10 PROCEDURE — 99207 PR NON-BILLABLE SERV PER CHARTING: CPT | Performed by: NURSE PRACTITIONER

## 2023-12-12 ENCOUNTER — TELEPHONE (OUTPATIENT)
Dept: FAMILY MEDICINE | Facility: CLINIC | Age: 58
End: 2023-12-12
Payer: COMMERCIAL

## 2023-12-12 NOTE — TELEPHONE ENCOUNTER
Left message for patient to call back.  He is traveling to [facility] I checked CDC website and there are a few recommended pre travel immunizations that we do not have here.  I want him to go to the [facility] for help determining which he is in need of.   [phone].  [facility]

## 2023-12-14 ENCOUNTER — MYC MEDICAL ADVICE (OUTPATIENT)
Dept: FAMILY MEDICINE | Facility: CLINIC | Age: 58
End: 2023-12-14
Payer: COMMERCIAL

## 2023-12-15 ENCOUNTER — IMMUNIZATION (OUTPATIENT)
Dept: FAMILY MEDICINE | Facility: CLINIC | Age: 58
End: 2023-12-15
Payer: COMMERCIAL

## 2023-12-15 PROCEDURE — 91320 SARSCV2 VAC 30MCG TRS-SUC IM: CPT

## 2023-12-15 PROCEDURE — 90480 ADMN SARSCOV2 VAC 1/ONLY CMP: CPT

## 2023-12-15 PROCEDURE — 90682 RIV4 VACC RECOMBINANT DNA IM: CPT

## 2023-12-15 PROCEDURE — 90471 IMMUNIZATION ADMIN: CPT

## 2023-12-15 NOTE — TELEPHONE ENCOUNTER
OK for patient to come in for Covid and Flu vaccine.  Needs to go to travel clinic for all other immunizations for travel.

## 2023-12-26 ENCOUNTER — VIRTUAL VISIT (OUTPATIENT)
Dept: FAMILY MEDICINE | Facility: CLINIC | Age: 58
End: 2023-12-26
Payer: COMMERCIAL

## 2023-12-26 DIAGNOSIS — Z29.89 ALTITUDE SICKNESS PREVENTATIVE MEASURES: Primary | ICD-10-CM

## 2023-12-26 PROCEDURE — 99213 OFFICE O/P EST LOW 20 MIN: CPT | Mod: 93 | Performed by: NURSE PRACTITIONER

## 2023-12-26 RX ORDER — ACETAZOLAMIDE 125 MG/1
TABLET ORAL
Qty: 14 TABLET | Refills: 0 | Status: SHIPPED | OUTPATIENT
Start: 2023-12-26 | End: 2024-01-03

## 2023-12-26 NOTE — PROGRESS NOTES
Joshua is a 58 year old who is being evaluated via a billable telephone visit.      What phone number would you like to be contacted at? [phone]  How would you like to obtain your AVS? Phyllis    Distant Location (provider location):  On-site    Assessment & Plan     Altitude sickness preventative measures    - acetaZOLAMIDE (DIAMOX) 125 MG tablet; Start twice a day 24 hours before ascend and continue for 2-3 days after descend.  Discussed how to take the medication(s), expected outcomes, potential side effects.             See Patient Instructions  Patient Instructions   Can come into clinic for shingles vaccine at any point.  RX sent for altitude sickness prevention.  Have a great trip!      Our Clinic hours are:  Mondays    7:20 am - 7 pm  Tues -  Fri  7:20 am - 5 pm    Clinic Phone: [phone]    The clinic lab opens at 7:30 am Mon - Fri and appointments are required.    [facility]. [phone]  Monday  8 am - 7pm  Tues - Fri 8 am - 5:30 pm         HARDIK Lobato [facility]    Brayden Lewis is a 58 year old, presenting for the following health issues:  Imm/Inj (Questions about travel and what immunizations are needed )        12/26/2023     3:02 PM   Additional Questions   Roomed by        Imm/Inj    History of Present Illness       Reason for visit:  Traveling to [place], looking for medications and vaccination info    He eats 4 or more servings of fruits and vegetables daily.He consumes 1 sweetened beverage(s) daily.He exercises with enough effort to increase his heart rate 30 to 60 minutes per day.  He exercises with enough effort to increase his heart rate 7 days per week.   He is taking medications regularly.       He has appointment tomorrow at travel clinic for immunizations.  Would like something to prevent altitude sickness as he is climbing [facility].          Review of Systems   Constitutional, HEENT,  cardiovascular, pulmonary, gi and gu systems are negative, except as otherwise noted.      Objective           Vitals:  No vitals were obtained today due to virtual visit.    Physical Exam   healthy, alert, and no distress  PSYCH: Alert and oriented times 3; coherent speech, normal   rate and volume, able to articulate logical thoughts, able   to abstract reason, no tangential thoughts, no hallucinations   or delusions  His affect is normal  RESP: No cough, no audible wheezing, able to talk in full sentences  Remainder of exam unable to be completed due to telephone visits                Phone call duration: 7 minutes

## 2023-12-26 NOTE — PATIENT INSTRUCTIONS
Can come into clinic for shingles vaccine at any point.  RX sent for altitude sickness prevention.  Have a great trip!      Our Clinic hours are:  Mondays    7:20 am - 7 pm  Tues -  Fri  7:20 am - 5 pm    Clinic Phone: [phone]    The clinic lab opens at 7:30 am Mon - Fri and appointments are required.    [facility]  Ph. [phone]  Monday  8 am - 7pm  Tues - Fri 8 am - 5:30 pm

## 2024-01-03 ENCOUNTER — MYC REFILL (OUTPATIENT)
Dept: FAMILY MEDICINE | Facility: CLINIC | Age: 59
End: 2024-01-03
Payer: COMMERCIAL

## 2024-01-03 DIAGNOSIS — Z29.89 ALTITUDE SICKNESS PREVENTATIVE MEASURES: ICD-10-CM

## 2024-01-04 RX ORDER — ACETAZOLAMIDE 125 MG/1
TABLET ORAL
Qty: 14 TABLET | Refills: 0 | Status: SHIPPED | OUTPATIENT
Start: 2024-01-04

## 2024-11-30 ENCOUNTER — HEALTH MAINTENANCE LETTER (OUTPATIENT)
Age: 59
End: 2024-11-30

## 2025-03-28 SDOH — HEALTH STABILITY: PHYSICAL HEALTH: ON AVERAGE, HOW MANY DAYS PER WEEK DO YOU ENGAGE IN MODERATE TO STRENUOUS EXERCISE (LIKE A BRISK WALK)?: 7 DAYS

## 2025-03-28 SDOH — HEALTH STABILITY: PHYSICAL HEALTH: ON AVERAGE, HOW MANY MINUTES DO YOU ENGAGE IN EXERCISE AT THIS LEVEL?: 40 MIN

## 2025-03-28 ASSESSMENT — SOCIAL DETERMINANTS OF HEALTH (SDOH): HOW OFTEN DO YOU GET TOGETHER WITH FRIENDS OR RELATIVES?: THREE TIMES A WEEK

## 2025-04-02 ENCOUNTER — OFFICE VISIT (OUTPATIENT)
Dept: FAMILY MEDICINE | Facility: CLINIC | Age: 60
End: 2025-04-02
Payer: COMMERCIAL

## 2025-04-02 VITALS
WEIGHT: 164 LBS | OXYGEN SATURATION: 97 % | BODY MASS INDEX: 24.29 KG/M2 | SYSTOLIC BLOOD PRESSURE: 134 MMHG | HEIGHT: 69 IN | HEART RATE: 77 BPM | RESPIRATION RATE: 16 BRPM | DIASTOLIC BLOOD PRESSURE: 68 MMHG

## 2025-04-02 DIAGNOSIS — I87.2 STASIS DERMATITIS: ICD-10-CM

## 2025-04-02 DIAGNOSIS — R01.1 MURMUR, CARDIAC: ICD-10-CM

## 2025-04-02 DIAGNOSIS — Z12.11 SCREEN FOR COLON CANCER: ICD-10-CM

## 2025-04-02 DIAGNOSIS — Z13.1 SCREENING FOR DIABETES MELLITUS: ICD-10-CM

## 2025-04-02 DIAGNOSIS — I83.812 VARICOSE VEINS OF LEFT LOWER EXTREMITY WITH PAIN: ICD-10-CM

## 2025-04-02 DIAGNOSIS — Z00.00 ROUTINE GENERAL MEDICAL EXAMINATION AT A HEALTH CARE FACILITY: Primary | ICD-10-CM

## 2025-04-02 DIAGNOSIS — Z12.5 SCREENING FOR PROSTATE CANCER: ICD-10-CM

## 2025-04-02 LAB
ALBUMIN SERPL BCG-MCNC: 4.6 G/DL (ref 3.5–5.2)
ALP SERPL-CCNC: 102 U/L (ref 40–150)
ALT SERPL W P-5'-P-CCNC: 17 U/L (ref 0–70)
ANION GAP SERPL CALCULATED.3IONS-SCNC: 12 MMOL/L (ref 7–15)
AST SERPL W P-5'-P-CCNC: 21 U/L (ref 0–45)
BILIRUB SERPL-MCNC: 0.4 MG/DL
BUN SERPL-MCNC: 13.5 MG/DL (ref 8–23)
CALCIUM SERPL-MCNC: 9.5 MG/DL (ref 8.8–10.4)
CHLORIDE SERPL-SCNC: 100 MMOL/L (ref 98–107)
CREAT SERPL-MCNC: 1.12 MG/DL (ref 0.67–1.17)
EGFRCR SERPLBLD CKD-EPI 2021: 75 ML/MIN/1.73M2
GLUCOSE SERPL-MCNC: 93 MG/DL (ref 70–99)
HCO3 SERPL-SCNC: 27 MMOL/L (ref 22–29)
POTASSIUM SERPL-SCNC: 4.3 MMOL/L (ref 3.4–5.3)
PROT SERPL-MCNC: 7.3 G/DL (ref 6.4–8.3)
PSA SERPL DL<=0.01 NG/ML-MCNC: 1.78 NG/ML (ref 0–4.5)
SODIUM SERPL-SCNC: 139 MMOL/L (ref 135–145)

## 2025-04-02 PROCEDURE — G0103 PSA SCREENING: HCPCS | Performed by: NURSE PRACTITIONER

## 2025-04-02 PROCEDURE — 3075F SYST BP GE 130 - 139MM HG: CPT | Performed by: NURSE PRACTITIONER

## 2025-04-02 PROCEDURE — 1126F AMNT PAIN NOTED NONE PRSNT: CPT | Performed by: NURSE PRACTITIONER

## 2025-04-02 PROCEDURE — 36415 COLL VENOUS BLD VENIPUNCTURE: CPT | Performed by: NURSE PRACTITIONER

## 2025-04-02 PROCEDURE — 3078F DIAST BP <80 MM HG: CPT | Performed by: NURSE PRACTITIONER

## 2025-04-02 PROCEDURE — 99213 OFFICE O/P EST LOW 20 MIN: CPT | Mod: 25 | Performed by: NURSE PRACTITIONER

## 2025-04-02 PROCEDURE — 80053 COMPREHEN METABOLIC PANEL: CPT | Performed by: NURSE PRACTITIONER

## 2025-04-02 PROCEDURE — 90471 IMMUNIZATION ADMIN: CPT | Performed by: NURSE PRACTITIONER

## 2025-04-02 PROCEDURE — 90750 HZV VACC RECOMBINANT IM: CPT | Performed by: NURSE PRACTITIONER

## 2025-04-02 PROCEDURE — 99396 PREV VISIT EST AGE 40-64: CPT | Mod: 25 | Performed by: NURSE PRACTITIONER

## 2025-04-02 PROCEDURE — 90677 PCV20 VACCINE IM: CPT | Performed by: NURSE PRACTITIONER

## 2025-04-02 PROCEDURE — 90472 IMMUNIZATION ADMIN EACH ADD: CPT | Performed by: NURSE PRACTITIONER

## 2025-04-02 RX ORDER — TRIAMCINOLONE ACETONIDE 1 MG/G
OINTMENT TOPICAL 2 TIMES DAILY
Qty: 30 G | Refills: 1 | Status: SHIPPED | OUTPATIENT
Start: 2025-04-02

## 2025-04-02 ASSESSMENT — PAIN SCALES - GENERAL: PAINLEVEL_OUTOF10: NO PAIN (0)

## 2025-04-02 NOTE — PROGRESS NOTES
Preventive Care Visit  [facility]  Lisbeth Burns, HARDIK CNP, Family Medicine  Apr 2, 2025      Assessment & Plan     Routine general medical examination at a health care facility      Screen for colon cancer    - Colonoscopy Screening  Referral; Future    Murmur, cardiac    - Echocardiogram Complete; Future  Order placed to do echocardiogram for monitoring.  Previous echocardiogram with dilatation of ascending aorta.  No present symptoms of concern.    Screening for prostate cancer    - PSA, screen; Future  - PSA, screen    Screening for diabetes mellitus    - Comprehensive metabolic panel (BMP + Alb, Alk Phos, ALT, AST, Total. Bili, TP); Future  - Comprehensive metabolic panel (BMP + Alb, Alk Phos, ALT, AST, Total. Bili, TP)    Varicose veins of left lower extremity with pain    - Vascular Surgery Referral; Future  Referral placed for consult with vascular surgery to discuss options.  He is having pain in veins.    Stasis dermatitis    - triamcinolone (KENALOG) 0.1 % external ointment; Apply topically 2 times daily.  Discussed how to take the medication(s), expected outcomes, potential side effects.            Counseling  Appropriate preventive services were addressed with this patient via screening, questionnaire, or discussion as appropriate for fall prevention, nutrition, physical activity, Tobacco-use cessation, social engagement, weight loss and cognition.  Checklist reviewing preventive services available has been given to the patient.  Reviewed patient's diet, addressing concerns and/or questions.   The patient was instructed to see the dentist every 6 months.       See Patient Instructions  Patient Instructions   Call [phone] to schedule echocardiogram.  Do colonoscopy.  Shingles and pneumonia shots given.  Will be notified of pending labs.  See vascular for help with varicose veins.  Use ointment as needed for rash on lower left leg.          Patient  Education  Preventive Care Advice   This is general advice given by our system to help you stay healthy. However, your care team may have specific advice just for you. Please talk to your care team about your preventive care needs.  Nutrition  Eat 5 or more servings of fruits and vegetables each day.  Try wheat bread, brown rice and whole grain pasta (instead of white bread, rice, and pasta).  Get enough calcium and vitamin D. Check the label on foods and aim for 100% of the RDA (recommended daily allowance).  Lifestyle  Exercise at least 150 minutes each week  (30 minutes a day, 5 days a week).  Do muscle strengthening activities 2 days a week. These help control your weight and prevent disease.  No smoking.  Wear sunscreen to prevent skin cancer.  Have a dental exam and cleaning every 6 months.  Yearly exams  See your health care team every year to talk about:  Any changes in your health.  Any medicines your care team has prescribed.  Preventive care, family planning, and ways to prevent chronic diseases.  Shots (vaccines)   HPV shots (up to age 26), if you've never had them before.  Hepatitis B shots (up to age 59), if you've never had them before.  COVID-19 shot: Get this shot when it's due.  Flu shot: Get a flu shot every year.  Tetanus shot: Get a tetanus shot every 10 years.  Pneumococcal, hepatitis A, and RSV shots: Ask your care team if you need these based on your risk.  Shingles shot (for age 50 and up)  General health tests  Diabetes screening:  Starting at age 35, Get screened for diabetes at least every 3 years.  If you are younger than age 35, ask your care team if you should be screened for diabetes.  Cholesterol test: At age 39, start having a cholesterol test every 5 years, or more often if advised.  Bone density scan (DEXA): At age 50, ask your care team if you should have this scan for osteoporosis (brittle bones).  Hepatitis C: Get tested at least once in your life.  STIs (sexually transmitted  infections)  Before age 24: Ask your care team if you should be screened for STIs.  After age 24: Get screened for STIs if you're at risk. You are at risk for STIs (including HIV) if:  You are sexually active with more than one person.  You don't use condoms every time.  You or a partner was diagnosed with a sexually transmitted infection.  If you are at risk for HIV, ask about PrEP medicine to prevent HIV.  Get tested for HIV at least once in your life, whether you are at risk for HIV or not.  Cancer screening tests  Cervical cancer screening: If you have a cervix, begin getting regular cervical cancer screening tests starting at age 21.  Breast cancer scan (mammogram): If you've ever had breasts, begin having regular mammograms starting at age 40. This is a scan to check for breast cancer.  Colon cancer screening: It is important to start screening for colon cancer at age 45.  Have a colonoscopy test every 10 years (or more often if you're at risk) Or, ask your provider about stool tests like a FIT test every year or Cologuard test every 3 years.  To learn more about your testing options, visit:   .  For help making a decision, visit:   https://bit.ly/ce14195.  Prostate cancer screening test: If you have a prostate, ask your care team if a prostate cancer screening test (PSA) at age 55 is right for you.  Lung cancer screening: If you are a current or former smoker ages 50 to 80, ask your care team if ongoing lung cancer screenings are right for you.  For informational purposes only. Not to replace the advice of your health care provider. Copyright   2023 [facility]. All rights reserved. Clinically reviewed by the [facility] Transitions Program. [employer] [id number] - REV 01/24.       Brayden Lewis is a 59 year old, presenting for the following:  Physical        4/2/2025     1:09 PM   Additional Questions   Roomed by Chapo          HPI     He is generally doing well.   He has had a rash on  his left lower leg. He also has varicose veins, both legs, but worse on left lower leg.         Advance Care Planning  Patient does not have a Health Care Directive: Discussed advance care planning with patient; information given to patient to review.      3/28/2025   General Health   How would you rate your overall physical health? Excellent   Feel stress (tense, anxious, or unable to sleep) Only a little   (!) STRESS CONCERN      3/28/2025   Nutrition   Three or more servings of calcium each day? Yes   Diet: Regular (no restrictions)   How many servings of fruit and vegetables per day? (!) 2-3   How many sweetened beverages each day? 0-1         3/28/2025   Exercise   Days per week of moderate/strenous exercise 7 days   Average minutes spent exercising at this level 40 min         3/28/2025   Social Factors   Frequency of gathering with friends or relatives Three times a week   Worry food won't last until get money to buy more No   Food not last or not have enough money for food? No   Do you have housing? (Housing is defined as stable permanent housing and does not include staying ouside in a car, in a tent, in an abandoned building, in an overnight shelter, or couch-surfing.) Yes   Are you worried about losing your housing? No   Lack of transportation? No   Unable to get utilities (heat,electricity)? No         3/28/2025   Fall Risk   Fallen 2 or more times in the past year? No   Trouble with walking or balance? No          3/28/2025   Dental   Dentist two times every year? (!) NO           Today's PHQ-2 Score:       4/2/2025     1:03 PM   PHQ-2 ( 1999 [employer])   Q1: Little interest or pleasure in doing things 0   Q2: Feeling down, depressed or hopeless 0   PHQ-2 Score 0    Q1: Little interest or pleasure in doing things Not at all   Q2: Feeling down, depressed or hopeless Not at all   PHQ-2 Score 0       Patient-reported           3/28/2025   Substance Use   Alcohol more than 3/day or more than 7/wk No   Do you  "use any other substances recreationally? No     Social History     Tobacco Use    Smoking status: Never    Smokeless tobacco: Never   Substance Use Topics    Alcohol use: Yes     Comment: 4-8 beers/week    Drug use: No           3/28/2025   STI Screening   New sexual partner(s) since last STI/HIV test? No   Last PSA: No results found for: \"PSA\"  ASCVD Risk   The 10-year ASCVD risk score (Eileen KINGSLEY, et al., 2019) is: 8.5%    Values used to calculate the score:      Age: 59 years      Sex: Male      Is Non- : No      Diabetic: No      Tobacco smoker: No      Systolic Blood Pressure: 134 mmHg      Is BP treated: No      HDL Cholesterol: 80 mg/dL      Total Cholesterol: 263 mg/dL           Reviewed and updated as needed this visit by Provider                    Past Medical History:   Diagnosis Date    Cardiac murmur      Past Surgical History:   Procedure Laterality Date    ENT SURGERY      adenoidectomy and PE tubes as a child    HERNIA REPAIR      bilateral inguinal 2010     BP Readings from Last 3 Encounters:   04/02/25 134/68   06/11/21 122/60   01/14/20 136/70    Wt Readings from Last 3 Encounters:   04/02/25 74.4 kg (164 lb)   06/11/21 73 kg (161 lb)   01/14/20 72.6 kg (160 lb)                  Patient Active Problem List   Diagnosis    Murmur, cardiac    Tubular adenoma of colon     Past Surgical History:   Procedure Laterality Date    ENT SURGERY      adenoidectomy and PE tubes as a child    HERNIA REPAIR      bilateral inguinal 2010       Social History     Tobacco Use    Smoking status: Never    Smokeless tobacco: Never   Substance Use Topics    Alcohol use: Yes     Comment: 4-8 beers/week     Family History   Problem Relation Age of Onset    Dementia Mother     Lung Cancer Father          Current Outpatient Medications   Medication Sig Dispense Refill    triamcinolone (KENALOG) 0.1 % external ointment Apply topically 2 times daily. 30 g 1    acetaZOLAMIDE (DIAMOX) 125 MG " "tablet Start twice a day 24 hours before ascend and continue for 2-3 days after descend. (Patient not taking: Reported on 4/2/2025) 14 tablet 0     Allergies   Allergen Reactions    Nka [No Known Allergies]          Review of Systems  Constitutional, HEENT, cardiovascular, pulmonary, gi and gu systems are negative, except as otherwise noted.     Objective    Exam  /68   Pulse 77   Resp 16   Ht 1.753 m (5' 9\")   Wt 74.4 kg (164 lb)   SpO2 97%   BMI 24.22 kg/m     Estimated body mass index is 24.22 kg/m  as calculated from the following:    Height as of this encounter: 1.753 m (5' 9\").    Weight as of this encounter: 74.4 kg (164 lb).    Physical Exam  GENERAL: alert and no distress  EYES: Eyes grossly normal to inspection, PERRL and conjunctivae and sclerae normal  HENT: ear canals and TM's normal, nose and mouth without ulcers or lesions  NECK: no adenopathy, no asymmetry, masses, or scars  RESP: lungs clear to auscultation - no rales, rhonchi or wheezes  CV: regular rate and rhythm, 2/6 murmur, no click or rub, no peripheral edema  ABDOMEN: soft, nontender, no hepatosplenomegaly, no masses and bowel sounds normal  MS: no gross musculoskeletal defects noted, no edema, varicose veins bilaterally, much worse on left  SKIN: no suspicious lesions, small raised rash on left lower ankle around varicosities  NEURO: Normal strength and tone, mentation intact and speech normal  PSYCH: mentation appears normal, affect normal/bright        Signed Electronically by: HARDIK Lobato CNP    "

## 2025-04-02 NOTE — PATIENT INSTRUCTIONS
Call [phone] to schedule echocardiogram.  Do colonoscopy.  Shingles and pneumonia shots given.  Will be notified of pending labs.  See vascular for help with varicose veins.  Use ointment as needed for rash on lower left leg.          Patient Education   Preventive Care Advice   This is general advice given by our system to help you stay healthy. However, your care team may have specific advice just for you. Please talk to your care team about your preventive care needs.  Nutrition  Eat 5 or more servings of fruits and vegetables each day.  Try wheat bread, brown rice and whole grain pasta (instead of white bread, rice, and pasta).  Get enough calcium and vitamin D. Check the label on foods and aim for 100% of the RDA (recommended daily allowance).  Lifestyle  Exercise at least 150 minutes each week  (30 minutes a day, 5 days a week).  Do muscle strengthening activities 2 days a week. These help control your weight and prevent disease.  No smoking.  Wear sunscreen to prevent skin cancer.  Have a dental exam and cleaning every 6 months.  Yearly exams  See your health care team every year to talk about:  Any changes in your health.  Any medicines your care team has prescribed.  Preventive care, family planning, and ways to prevent chronic diseases.  Shots (vaccines)   HPV shots (up to age 26), if you've never had them before.  Hepatitis B shots (up to age 59), if you've never had them before.  COVID-19 shot: Get this shot when it's due.  Flu shot: Get a flu shot every year.  Tetanus shot: Get a tetanus shot every 10 years.  Pneumococcal, hepatitis A, and RSV shots: Ask your care team if you need these based on your risk.  Shingles shot (for age 50 and up)  General health tests  Diabetes screening:  Starting at age 35, Get screened for diabetes at least every 3 years.  If you are younger than age 35, ask your care team if you should be screened for diabetes.  Cholesterol test: At age 39, start having a cholesterol  test every 5 years, or more often if advised.  Bone density scan (DEXA): At age 50, ask your care team if you should have this scan for osteoporosis (brittle bones).  Hepatitis C: Get tested at least once in your life.  STIs (sexually transmitted infections)  Before age 24: Ask your care team if you should be screened for STIs.  After age 24: Get screened for STIs if you're at risk. You are at risk for STIs (including HIV) if:  You are sexually active with more than one person.  You don't use condoms every time.  You or a partner was diagnosed with a sexually transmitted infection.  If you are at risk for HIV, ask about PrEP medicine to prevent HIV.  Get tested for HIV at least once in your life, whether you are at risk for HIV or not.  Cancer screening tests  Cervical cancer screening: If you have a cervix, begin getting regular cervical cancer screening tests starting at age 21.  Breast cancer scan (mammogram): If you've ever had breasts, begin having regular mammograms starting at age 40. This is a scan to check for breast cancer.  Colon cancer screening: It is important to start screening for colon cancer at age 45.  Have a colonoscopy test every 10 years (or more often if you're at risk) Or, ask your provider about stool tests like a FIT test every year or Cologuard test every 3 years.  To learn more about your testing options, visit:   .  For help making a decision, visit:   https://bit.ly/ey60403.  Prostate cancer screening test: If you have a prostate, ask your care team if a prostate cancer screening test (PSA) at age 55 is right for you.  Lung cancer screening: If you are a current or former smoker ages 50 to 80, ask your care team if ongoing lung cancer screenings are right for you.  For informational purposes only. Not to replace the advice of your health care provider. Copyright   2023 [employer]. All rights reserved. Clinically reviewed by the [facility] Transitions Program.  [employer] [id number] - REV 01/24.

## 2025-04-03 ENCOUNTER — PATIENT OUTREACH (OUTPATIENT)
Dept: GASTROENTEROLOGY | Facility: CLINIC | Age: 60
End: 2025-04-03
Payer: COMMERCIAL

## 2025-04-10 ENCOUNTER — MYC MEDICAL ADVICE (OUTPATIENT)
Dept: FAMILY MEDICINE | Facility: CLINIC | Age: 60
End: 2025-04-10
Payer: COMMERCIAL

## 2025-04-21 ENCOUNTER — HOSPITAL ENCOUNTER (OUTPATIENT)
Dept: CARDIOLOGY | Facility: CLINIC | Age: 60
Discharge: HOME | End: 2025-04-21
Attending: NURSE PRACTITIONER | Admitting: NURSE PRACTITIONER
Payer: COMMERCIAL

## 2025-04-21 DIAGNOSIS — R01.1 MURMUR, CARDIAC: ICD-10-CM

## 2025-04-21 LAB — LVEF ECHO: NORMAL

## 2025-04-21 PROCEDURE — 93306 TTE W/DOPPLER COMPLETE: CPT

## 2025-04-21 PROCEDURE — 93306 TTE W/DOPPLER COMPLETE: CPT | Mod: 26 | Performed by: INTERNAL MEDICINE

## 2025-05-07 ENCOUNTER — OFFICE VISIT (OUTPATIENT)
Dept: CARDIOLOGY | Facility: CLINIC | Age: 60
End: 2025-05-07
Attending: NURSE PRACTITIONER
Payer: COMMERCIAL

## 2025-05-07 VITALS
BODY MASS INDEX: 24.14 KG/M2 | DIASTOLIC BLOOD PRESSURE: 78 MMHG | WEIGHT: 163 LBS | RESPIRATION RATE: 16 BRPM | HEART RATE: 74 BPM | HEIGHT: 69 IN | SYSTOLIC BLOOD PRESSURE: 126 MMHG

## 2025-05-07 DIAGNOSIS — Q23.81 BICUSPID AORTIC VALVE WITH ASCENDING AORTA 4.0 TO 4.5 CM IN DIAMETER: Primary | ICD-10-CM

## 2025-05-07 PROCEDURE — 3074F SYST BP LT 130 MM HG: CPT | Performed by: INTERNAL MEDICINE

## 2025-05-07 PROCEDURE — 99204 OFFICE O/P NEW MOD 45 MIN: CPT | Performed by: INTERNAL MEDICINE

## 2025-05-07 PROCEDURE — 3078F DIAST BP <80 MM HG: CPT | Performed by: INTERNAL MEDICINE

## 2025-05-07 PROCEDURE — G2211 COMPLEX E/M VISIT ADD ON: HCPCS | Performed by: INTERNAL MEDICINE

## 2025-05-07 NOTE — PATIENT INSTRUCTIONS
Schedule echo in 1 year  Followup with me after echocardiogram.    Have your children and siblings screening with echocardiogram

## 2025-05-07 NOTE — LETTER
"5/7/2025    Lisbeth Burns, APRN CNP  [address] [facility] [facility] [place] [address]    RE: Joshua Braden       Dear Colleague,     I had the pleasure of seeing Joshua Braden in the [facility].    [facility] [address]  [place] [address]   [email or web address]   OFFICE: [phone]     CARDIOLOGY CLINIC NOTE     Assessment/Recommendations   Assessment:     bicuspid aortic valve with aortopathy: Recommend annual follow-up with echocardiogram for ascending aorta dilatation, has been stable over the last several years, if there is any worsening pt agreeable to starting losartan.  Family screening recommended, discussed today.       HPI:   Patient is coming in to establish care for history of bicuspid valve diagnosed about 30 years ago.    His most recent echocardiogram shows moderate aortic stenosis and regurgitation, ascending aorta dilatation at 4.5 cm in formalin this is similar to his previous echocardiogram from 2021.      No other medical history.    History of lymphadenopathy screening.    Very active, hiked [facility] last year    PMH:     No significant past medical history        FH:   No FH of CAD/CHF/valvular disease  SH:   Non smoker      Medications  Allergies   Current Outpatient Medications   Medication Sig Dispense Refill     triamcinolone (KENALOG) 0.1 % external ointment Apply topically 2 times daily. 30 g 1     acetaZOLAMIDE (DIAMOX) 125 MG tablet Start twice a day 24 hours before ascend and continue for 2-3 days after descend. (Patient not taking: Reported on 4/2/2025) 14 tablet 0      Allergies   Allergen Reactions     Nka [No Known Allergies]         Physical Examination Review of Systems   Vitals: /78 (BP Location: Left arm, Patient Position: Sitting, Cuff Size: Adult Regular)   Pulse 74   Resp 16   Ht 1.753 m (5' 9\")   Wt 73.9 kg (163 lb)   BMI 24.07 kg/m    BMI= Body mass index is 24.07 kg/m .  Wt Readings " "from Last 3 Encounters:   05/07/25 73.9 kg (163 lb)   04/02/25 74.4 kg (164 lb)   06/11/21 73 kg (161 lb)       General: pleasant male. No acute distress.   Neck: No JVD  Lungs: clear to auscultation  COR:  regular rate and rhythm, No murmurs, rubs, or gallops  Extrem: No edema   Per HPI          Lab Results    Chemistry/lipid CBC Cardiac Enzymes/BNP/TSH/INR   Lab Results   Component Value Date    CHOL 263 (H) 06/11/2021    HDL 80 06/11/2021    TRIG 187 (H) 06/11/2021    BUN 13.5 04/02/2025     04/02/2025    CO2 27 04/02/2025    Lab Results   Component Value Date    WBC 7.9 10/06/2017    HGB 15.9 10/06/2017    HCT 47.5 10/06/2017    MCV 89 10/06/2017     10/06/2017    No results found for: \"CKTOTAL\", \"CKMB\", \"TROPONINI\", \"BNP\", \"TSH\", \"INR\"       The longitudinal plan of care for the diagnosis(es)/condition(s) as documented were addressed during this visit. Due to the added complexity in care, I will continue to support Joshua in the subsequent management and with ongoing continuity of care.          Popeye Parada MD, MPH  Non-invasive Cardiologist  [facility]         Thank you for allowing me to participate in the care of your patient.      Sincerely,     Popeye MALDONADO [facility]  cc:   Lisbeth Burns, APRN CNP  [address] [place] [address]      "

## 2025-05-07 NOTE — PROGRESS NOTES
"[facility]   [address]  [place] [address]   [email or web address]   OFFICE: [phone]     CARDIOLOGY CLINIC NOTE     Assessment/Recommendations   Assessment:     bicuspid aortic valve with aortopathy: Recommend annual follow-up with echocardiogram for ascending aorta dilatation, has been stable over the last several years, if there is any worsening pt agreeable to starting losartan.  Family screening recommended, discussed today.       HPI:   Patient is coming in to establish care for history of bicuspid valve diagnosed about 30 years ago.    His most recent echocardiogram shows moderate aortic stenosis and regurgitation, ascending aorta dilatation at 4.5 cm in formalin this is similar to his previous echocardiogram from 2021.      No other medical history.    History of lymphadenopathy screening.    Very active, hiked [facility] last year    PMH:     No significant past medical history        FH:   No FH of CAD/CHF/valvular disease  SH:   Non smoker      Medications  Allergies   Current Outpatient Medications   Medication Sig Dispense Refill    triamcinolone (KENALOG) 0.1 % external ointment Apply topically 2 times daily. 30 g 1    acetaZOLAMIDE (DIAMOX) 125 MG tablet Start twice a day 24 hours before ascend and continue for 2-3 days after descend. (Patient not taking: Reported on 4/2/2025) 14 tablet 0      Allergies   Allergen Reactions    Nka [No Known Allergies]         Physical Examination Review of Systems   Vitals: /78 (BP Location: Left arm, Patient Position: Sitting, Cuff Size: Adult Regular)   Pulse 74   Resp 16   Ht 1.753 m (5' 9\")   Wt 73.9 kg (163 lb)   BMI 24.07 kg/m    BMI= Body mass index is 24.07 kg/m .  Wt Readings from Last 3 Encounters:   05/07/25 73.9 kg (163 lb)   04/02/25 74.4 kg (164 lb)   06/11/21 73 kg (161 lb)       General: pleasant male. No acute distress.   Neck: No JVD  Lungs: clear to auscultation  COR:  regular " "rate and rhythm, No murmurs, rubs, or gallops  Extrem: No edema   Per HPI          Lab Results    Chemistry/lipid CBC Cardiac Enzymes/BNP/TSH/INR   Lab Results   Component Value Date    CHOL 263 (H) 06/11/2021    HDL 80 06/11/2021    TRIG 187 (H) 06/11/2021    BUN 13.5 04/02/2025     04/02/2025    CO2 27 04/02/2025    Lab Results   Component Value Date    WBC 7.9 10/06/2017    HGB 15.9 10/06/2017    HCT 47.5 10/06/2017    MCV 89 10/06/2017     10/06/2017    No results found for: \"CKTOTAL\", \"CKMB\", \"TROPONINI\", \"BNP\", \"TSH\", \"INR\"       The longitudinal plan of care for the diagnosis(es)/condition(s) as documented were addressed during this visit. Due to the added complexity in care, I will continue to support Joshua in the subsequent management and with ongoing continuity of care.          Popeye Parada MD, MPH  Non-invasive Cardiologist  [facility]       "

## (undated) RX ORDER — LIDOCAINE HYDROCHLORIDE 10 MG/ML
INJECTION, SOLUTION EPIDURAL; INFILTRATION; INTRACAUDAL; PERINEURAL
Status: DISPENSED
Start: 2017-11-03

## (undated) RX ORDER — GLYCOPYRROLATE 0.2 MG/ML
INJECTION, SOLUTION INTRAMUSCULAR; INTRAVENOUS
Status: DISPENSED
Start: 2017-11-03